# Patient Record
Sex: FEMALE | Race: WHITE | NOT HISPANIC OR LATINO | Employment: OTHER | URBAN - METROPOLITAN AREA
[De-identification: names, ages, dates, MRNs, and addresses within clinical notes are randomized per-mention and may not be internally consistent; named-entity substitution may affect disease eponyms.]

---

## 2021-09-23 NOTE — PLAN OF CARE
Patient Name: Bee Andrews    : 1950    MRN: [id number]    Surgeon: Dr Osborne    DOS/Procedure:  2021 - L-TKA Revision    Hospital: [facility]    PCP-Joleen Ritter [facility] ([place]) BCBS gold Medicare         Assessment Findings: Will update with additional concerns from the H&P as indicated  Medical              Fibromyalgia - managed voltaren              Osteopenia              MECHELLE- cpap  (1pt)              BMI = 32.54              Functional              Currently Ambulation - Cane (currently doing upper body strength & isometric exercise)              Met score - steering clear of people Covid - denied sob (very active in the past)              Falls -0         Living Situation              Stairs to enter home -  3 steps from garage - all one level              Bathroom setup - Walk -in shower - hand held shower - shower chair -elevated toilet - with counter close to hang onto              Transportation available -  will take her (daughter lives in [place] and lives close)              Support         Post Op Support/Resources              3 adults - available to provide support              Commode              Walker & cane    Discharge Disposition              Daughter s home - 3 steps to enter from garage - all one level (Higher profile toilet)- shower 1 step in              Stay at daughters for first 2 weeks - HEP - will schedule outpatient therapy      Please direct questions or concerns to:  Barbi Alejandre RN  Trauma Coordinator     [facility]  [place]  [address]   [place] [address]  T: [phone]  C. [phone]  F: [phone]  E: [email or web address]

## 2021-10-18 DIAGNOSIS — Z11.59 ENCOUNTER FOR SCREENING FOR OTHER VIRAL DISEASES: ICD-10-CM

## 2021-11-16 ENCOUNTER — LAB (OUTPATIENT)
Dept: URGENT CARE | Facility: URGENT CARE | Age: 71
End: 2021-11-16
Attending: ORTHOPAEDIC SURGERY
Payer: MEDICARE

## 2021-11-16 DIAGNOSIS — Z11.59 ENCOUNTER FOR SCREENING FOR OTHER VIRAL DISEASES: ICD-10-CM

## 2021-11-16 LAB — SARS-COV-2 RNA RESP QL NAA+PROBE: NEGATIVE

## 2021-11-16 PROCEDURE — U0003 INFECTIOUS AGENT DETECTION BY NUCLEIC ACID (DNA OR RNA); SEVERE ACUTE RESPIRATORY SYNDROME CORONAVIRUS 2 (SARS-COV-2) (CORONAVIRUS DISEASE [COVID-19]), AMPLIFIED PROBE TECHNIQUE, MAKING USE OF HIGH THROUGHPUT TECHNOLOGIES AS DESCRIBED BY CMS-2020-01-R: HCPCS

## 2021-11-16 PROCEDURE — U0005 INFEC AGEN DETEC AMPLI PROBE: HCPCS

## 2021-11-17 RX ORDER — MULTIVIT WITH MINERALS/LUTEIN
1 TABLET ORAL DAILY
COMMUNITY

## 2021-11-17 RX ORDER — VITAMIN E 268 MG
1 CAPSULE ORAL DAILY
COMMUNITY

## 2021-11-17 RX ORDER — EZETIMIBE 10 MG/1
10 TABLET ORAL AT BEDTIME
COMMUNITY

## 2021-11-17 RX ORDER — AMOXICILLIN 500 MG/1
2000 CAPSULE ORAL
COMMUNITY

## 2021-11-17 RX ORDER — KETOCONAZOLE 20 MG/ML
SHAMPOO TOPICAL
COMMUNITY
End: 2022-04-19

## 2021-11-17 RX ORDER — ACETAMINOPHEN 500 MG
1500 TABLET ORAL EVERY 6 HOURS PRN
Status: ON HOLD | COMMUNITY
End: 2021-11-20

## 2021-11-17 RX ORDER — TURMERIC ROOT EXTRACT 500 MG
1 TABLET ORAL DAILY
COMMUNITY

## 2021-11-17 RX ORDER — CHLORAL HYDRATE 500 MG
1 CAPSULE ORAL DAILY
COMMUNITY
End: 2022-04-19

## 2021-11-17 RX ORDER — LORATADINE 10 MG/1
1-2 TABLET ORAL DAILY PRN
COMMUNITY

## 2021-11-18 ENCOUNTER — ANESTHESIA EVENT (OUTPATIENT)
Dept: SURGERY | Facility: CLINIC | Age: 71
End: 2021-11-18
Payer: MEDICARE

## 2021-11-18 NOTE — ANESTHESIA PREPROCEDURE EVALUATION
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]  Osteoarthritis of right knee, unspecified osteoarthritis type [M17.11]    Procedure : Procedure(s):  REVISION LEFT TOTAL KNEE ARTHROPLASTY          Past Medical History:   Diagnosis Date     Fibromyalgia      Gastroesophageal reflux disease      Hyperlipidemia      Osteopenia      Sleep apnea      Tinnitus of both ears       Past Surgical History:   Procedure Laterality Date     COLONOSCOPY       GYN SURGERY      tubal ligation     ORTHOPEDIC SURGERY Left     L TKA     ORTHOPEDIC SURGERY Right     R TKA      No Known Allergies   Social History     Tobacco Use     Smoking status: Never Smoker     Smokeless tobacco: Never Used   Substance Use Topics     Alcohol use: Never      Wt Readings from Last 1 Encounters:   No data found for Wt        Anesthesia Evaluation   Pt has had prior anesthetic.     No history of anesthetic complications       ROS/MED HX  ENT/Pulmonary:     (+) sleep apnea, uses CPAP,  (-) tobacco use and asthma   Neurologic: Comment: Fibromyalgia, tinnitus       Cardiovascular: Comment: SR, first degree AVB     (+) Dyslipidemia ----- (-) SIEGEL, orthopnea/PND and syncope   METS/Exercise Tolerance: >4 METS    Hematologic:    (-) history of blood clots   Musculoskeletal:   (+) arthritis,     GI/Hepatic: Comment: Schatzki ring, ulcers     (+) GERD, Asymptomatic on medication,     Renal/Genitourinary:    (-) renal disease   Endo:     (+) Obesity,  (-) Type II DM   Psychiatric/Substance Use:       Infectious Disease:    (-) Recent Fever   Malignancy:       Other:      (+) , H/O Chronic Pain,        Physical Exam    Airway        Mallampati: II   TM distance: > 3 FB   Neck ROM: full   Mouth opening: > 3 cm    Respiratory Devices and Support         Dental       (+) caps      Cardiovascular          Rhythm  and rate: regular     Pulmonary           breath sounds clear to auscultation           OUTSIDE LABS:  CBC: No results found for: WBC, HGB, HCT, PLT  BMP: No results found for: NA, POTASSIUM, CHLORIDE, CO2, BUN, CR, GLC  COAGS: No results found for: PTT, INR, FIBR  POC: No results found for: BGM, HCG, HCGS  HEPATIC: No results found for: ALBUMIN, PROTTOTAL, ALT, AST, GGT, ALKPHOS, BILITOTAL, BILIDIRECT, CHARLETTE  OTHER: No results found for: PH, LACT, A1C, SIMONA, PHOS, MAG, LIPASE, AMYLASE, TSH, T4, T3, CRP, SED  Prior to Admission medications    Medication Sig Start Date End Date Taking? Authorizing Provider   acetaminophen (TYLENOL) 500 MG tablet Take 1,500 mg by mouth every 6 hours as needed for mild pain (3 X 500 mg)   Yes Reported, Patient   amoxicillin (AMOXIL) 500 MG capsule Take 2,000 mg by mouth once as needed (ONE HOUR PRIOR TO DENTAL APPOINTMENT)   Yes Reported, Patient   Calcium-Magnesium-Vitamin D (CALCIUM 1200+D3 PO) Take 1 capsule by mouth daily   Yes Reported, Patient   Carboxymethylcellulose Sodium (REFRESH TEARS OP) Place 1 drop into both eyes 2 times daily as needed   Yes Reported, Patient   ezetimibe (ZETIA) 10 MG tablet Take 10 mg by mouth daily   Yes Reported, Patient   fish oil-omega-3 fatty acids 1000 MG capsule Take 1 g by mouth daily   Yes Reported, Patient   Flaxseed, Linseed, (FLAXSEED OIL) 1000 MG CAPS Take 1 capsule by mouth daily   Yes Reported, Patient   ketoconazole (NIZORAL) 2 % external shampoo Apply topically twice a week   Yes Reported, Patient   loratadine (CLARITIN) 10 MG tablet Take 1-2 tablets by mouth daily as needed for allergies   Yes Reported, Patient   Multiple Vitamins-Minerals (PRESERVISION AREDS 2 PO) Take 2 capsules by mouth daily   Yes Reported, Patient   multivitamin (CENTRUM SILVER) tablet Take 1 tablet by mouth daily   Yes Reported, Patient   Phenylephrine-APAP-guaiFENesin (TYLENOL SINUS SEVERE PO) Take 1 tablet by mouth daily as needed   Yes Reported, Patient    Turmeric 500 MG TABS Take 1 tablet by mouth daily   Yes Reported, Patient     Current Facility-Administered Medications Ordered in Epic   Medication Dose Route Frequency Last Rate Last Admin     ceFAZolin (ANCEF) intermittent infusion 2 g in 100 mL dextrose PRE-MIX  2 g Intravenous Pre-Op/Pre-procedure x 1 dose         ceFAZolin (ANCEF) intermittent infusion 2 g in 100 mL dextrose PRE-MIX  2 g Intravenous See Admin Instructions         lactated ringers infusion   Intravenous Continuous 25 mL/hr at 11/19/21 0833 New Bag at 11/19/21 0833     lidocaine 1 % 0.1-1 mL  0.1-1 mL Other Q1H PRN         ropivacaine (NAROPIN) 300 mg, ketorolac (TORADOL) 30 mg, EPINEPHrine (ADRENALIN) 0.6 mg in sodium chloride 0.9 % 100 mL (ORTHO JOSSELIN STANDARD DOSE)   INTRA-ARTICULAR On Call to OR         No current [facility]-ordered outpatient medications on file.       lactated ringers 25 mL/hr at 11/19/21 0833     No results for input(s): ABO, RH in the last 89403 hours.  No results for input(s): HCG in the last 84981 hours.  No results found for this or any previous visit (from the past 744 hour(s)).    Anesthesia Plan    ASA Status:  2      Anesthesia Type: Spinal.              Consents    Anesthesia Plan(s) and associated risks, benefits, and realistic alternatives discussed. Questions answered and patient/representative(s) expressed understanding.    - Discussed:     - Discussed with:  Patient         Postoperative Care    Pain management: Peripheral nerve block (Single Shot).   PONV prophylaxis: Ondansetron (or other 5HT-3)     Comments:    Other Comments: No NSAIDs            Ashley Pablo MD

## 2021-11-18 NOTE — PROGRESS NOTES
PTA medications updated by Medication Scribe prior to surgery via phone call with patient (last doses completed by Nurse)     Medication history sources: Patient, H&P and Patient's home med list  In the past week, patient estimated taking medication this percent of the time: Greater than 90%  Adherence assessment: N/A Not Observed    Significant changes made to the medication list:  Patient reports no longer taking the following meds (med scribe removed from PTA med list): Omeprazole, Diclofenac Gel      Additional medication history information:   Patient brought own home meds: Refresh Tears     Medication reconciliation completed by provider prior to medication history? No    Time spent in this activity: 25 minutes    The information provided in this note is only as accurate as the sources available at the time of update(s)      Prior to Admission medications    Medication Sig Last Dose Taking? Auth Provider   acetaminophen (TYLENOL) 500 MG tablet Take 1,500 mg by mouth every 6 hours as needed for mild pain (3 X 500 mg)  at PRN Yes Reported, Patient   amoxicillin (AMOXIL) 500 MG capsule Take 2,000 mg by mouth once as needed (ONE HOUR PRIOR TO DENTAL APPOINTMENT)  at PRN Yes Reported, Patient   Calcium-Magnesium-Vitamin D (CALCIUM 1200+D3 PO) Take 1 capsule by mouth daily 11/18/2021 at AM Yes Reported, Patient   Carboxymethylcellulose Sodium (REFRESH TEARS OP) Place 1 drop into both eyes 2 times daily as needed 11/18/2021 at PM Yes Reported, Patient   ezetimibe (ZETIA) 10 MG tablet Take 10 mg by mouth daily 11/18/2021 at PM Yes Reported, Patient   fish oil-omega-3 fatty acids 1000 MG capsule Take 1 g by mouth daily 11/18/2021 at AM Yes Reported, Patient   Flaxseed, Linseed, (FLAXSEED OIL) 1000 MG CAPS Take 1 capsule by mouth daily 11/18/2021 at AM Yes Reported, Patient   ketoconazole (NIZORAL) 2 % external shampoo Apply topically twice a week Past Week at Unknown time Yes Reported, Patient   loratadine (CLARITIN)  10 MG tablet Take 1-2 tablets by mouth daily as needed for allergies  at PRN Yes Reported, Patient   Multiple Vitamins-Minerals (PRESERVISION AREDS 2 PO) Take 2 capsules by mouth daily 11/18/2021 at AM Yes Reported, Patient   multivitamin (CENTRUM SILVER) tablet Take 1 tablet by mouth daily 11/18/2021 at AM Yes Reported, Patient   Phenylephrine-APAP-guaiFENesin (TYLENOL SINUS SEVERE PO) Take 1 tablet by mouth daily as needed  at PRN Yes Reported, Patient   Turmeric 500 MG TABS Take 1 tablet by mouth daily 11/18/2021 at AM Yes Reported, Patient       Medication history completed by:    Clark Hernandez CPhT  Medication [facility]

## 2021-11-19 ENCOUNTER — ANESTHESIA (OUTPATIENT)
Dept: SURGERY | Facility: CLINIC | Age: 71
End: 2021-11-19
Payer: MEDICARE

## 2021-11-19 ENCOUNTER — APPOINTMENT (OUTPATIENT)
Dept: PHYSICAL THERAPY | Facility: CLINIC | Age: 71
End: 2021-11-19
Attending: ORTHOPAEDIC SURGERY
Payer: MEDICARE

## 2021-11-19 ENCOUNTER — HOSPITAL ENCOUNTER (OUTPATIENT)
Facility: CLINIC | Age: 71
Discharge: HOME | End: 2021-11-21
Attending: ORTHOPAEDIC SURGERY | Admitting: ORTHOPAEDIC SURGERY
Payer: MEDICARE

## 2021-11-19 DIAGNOSIS — Z96.652 STATUS POST REVISION OF TOTAL REPLACEMENT OF LEFT KNEE: Primary | ICD-10-CM

## 2021-11-19 PROBLEM — Z96.659 S/P REVISION OF TOTAL KNEE: Status: ACTIVE | Noted: 2021-11-19

## 2021-11-19 PROCEDURE — 370N000017 HC ANESTHESIA TECHNICAL FEE, PER MIN: Performed by: ORTHOPAEDIC SURGERY

## 2021-11-19 PROCEDURE — 250N000011 HC RX IP 250 OP 636: Performed by: ANESTHESIOLOGY

## 2021-11-19 PROCEDURE — 258N000003 HC RX IP 258 OP 636: Performed by: NURSE ANESTHETIST, CERTIFIED REGISTERED

## 2021-11-19 PROCEDURE — 999N000141 HC STATISTIC PRE-PROCEDURE NURSING ASSESSMENT: Performed by: ORTHOPAEDIC SURGERY

## 2021-11-19 PROCEDURE — 250N000011 HC RX IP 250 OP 636: Performed by: NURSE ANESTHETIST, CERTIFIED REGISTERED

## 2021-11-19 PROCEDURE — 272N000001 HC OR GENERAL SUPPLY STERILE: Performed by: ORTHOPAEDIC SURGERY

## 2021-11-19 PROCEDURE — 97116 GAIT TRAINING THERAPY: CPT | Mod: GP

## 2021-11-19 PROCEDURE — C1776 JOINT DEVICE (IMPLANTABLE): HCPCS | Performed by: ORTHOPAEDIC SURGERY

## 2021-11-19 PROCEDURE — 97161 PT EVAL LOW COMPLEX 20 MIN: CPT | Mod: GP

## 2021-11-19 PROCEDURE — 250N000011 HC RX IP 250 OP 636: Performed by: PHYSICIAN ASSISTANT

## 2021-11-19 PROCEDURE — 250N000009 HC RX 250: Performed by: ANESTHESIOLOGY

## 2021-11-19 PROCEDURE — 250N000009 HC RX 250: Performed by: NURSE ANESTHETIST, CERTIFIED REGISTERED

## 2021-11-19 PROCEDURE — 258N000003 HC RX IP 258 OP 636: Performed by: ANESTHESIOLOGY

## 2021-11-19 PROCEDURE — 250N000013 HC RX MED GY IP 250 OP 250 PS 637: Performed by: PHYSICIAN ASSISTANT

## 2021-11-19 PROCEDURE — 258N000003 HC RX IP 258 OP 636: Performed by: PHYSICIAN ASSISTANT

## 2021-11-19 PROCEDURE — 278N000051 HC OR IMPLANT GENERAL: Performed by: ORTHOPAEDIC SURGERY

## 2021-11-19 PROCEDURE — 360N000078 HC SURGERY LEVEL 5, PER MIN: Performed by: ORTHOPAEDIC SURGERY

## 2021-11-19 PROCEDURE — 710N000009 HC RECOVERY PHASE 1, LEVEL 1, PER MIN: Performed by: ORTHOPAEDIC SURGERY

## 2021-11-19 PROCEDURE — 250N000009 HC RX 250: Performed by: ORTHOPAEDIC SURGERY

## 2021-11-19 PROCEDURE — 97530 THERAPEUTIC ACTIVITIES: CPT | Mod: GP

## 2021-11-19 PROCEDURE — 258N000001 HC RX 258: Performed by: ORTHOPAEDIC SURGERY

## 2021-11-19 DEVICE — ATTUNE KNEE SYSTEM REVISION ROTATING PLATFORM TIBIAL BASE CEMENTED SIZE 4
Type: IMPLANTABLE DEVICE | Site: KNEE | Status: FUNCTIONAL
Brand: ATTUNE

## 2021-11-19 DEVICE — ATTUNE KNEE SYSTEM REVISION PRESSFIT STEM 12X60MM
Type: IMPLANTABLE DEVICE | Site: KNEE | Status: FUNCTIONAL
Brand: ATTUNE

## 2021-11-19 DEVICE — TOBRA FULL DOSE ANTIBIOTIC BONE CEMENT, 10 PACK CATALOG NUMBER IS 6197-9-010
Type: IMPLANTABLE DEVICE | Site: KNEE | Status: FUNCTIONAL
Brand: SIMPLEX

## 2021-11-19 DEVICE — FULL DOSE BONE CEMENT, 10 PACK CATALOG NUMBER IS 6191-1-010
Type: IMPLANTABLE DEVICE | Site: KNEE | Status: FUNCTIONAL
Brand: SIMPLEX

## 2021-11-19 DEVICE — ATTUNE KNEE SYSTEM REVISION POSTERIOR FEMORAL AUGMENT 8MM CEMENTED SIZE 4
Type: IMPLANTABLE DEVICE | Site: KNEE | Status: FUNCTIONAL
Brand: ATTUNE

## 2021-11-19 DEVICE — ATTUNE KNEE SYSTEM REVISION TIBIAL SLEEVE POROCOAT PARTIALLY COATED 37MM
Type: IMPLANTABLE DEVICE | Site: KNEE | Status: FUNCTIONAL
Brand: ATTUNE

## 2021-11-19 RX ORDER — OXYCODONE HYDROCHLORIDE 5 MG/1
10 TABLET ORAL EVERY 4 HOURS PRN
Status: DISCONTINUED | OUTPATIENT
Start: 2021-11-19 | End: 2021-11-21 | Stop reason: HOSPADM

## 2021-11-19 RX ORDER — HYDROMORPHONE HCL IN WATER/PF 6 MG/30 ML
0.2 PATIENT CONTROLLED ANALGESIA SYRINGE INTRAVENOUS EVERY 5 MIN PRN
Status: DISCONTINUED | OUTPATIENT
Start: 2021-11-19 | End: 2021-11-19 | Stop reason: HOSPADM

## 2021-11-19 RX ORDER — AMOXICILLIN 250 MG
1 CAPSULE ORAL 2 TIMES DAILY
Status: DISCONTINUED | OUTPATIENT
Start: 2021-11-19 | End: 2021-11-21 | Stop reason: HOSPADM

## 2021-11-19 RX ORDER — LIDOCAINE 40 MG/G
CREAM TOPICAL
Status: DISCONTINUED | OUTPATIENT
Start: 2021-11-19 | End: 2021-11-21 | Stop reason: HOSPADM

## 2021-11-19 RX ORDER — ONDANSETRON 2 MG/ML
4 INJECTION INTRAMUSCULAR; INTRAVENOUS EVERY 30 MIN PRN
Status: DISCONTINUED | OUTPATIENT
Start: 2021-11-19 | End: 2021-11-19 | Stop reason: HOSPADM

## 2021-11-19 RX ORDER — ONDANSETRON 2 MG/ML
INJECTION INTRAMUSCULAR; INTRAVENOUS PRN
Status: DISCONTINUED | OUTPATIENT
Start: 2021-11-19 | End: 2021-11-19

## 2021-11-19 RX ORDER — EPHEDRINE SULFATE 50 MG/ML
INJECTION, SOLUTION INTRAMUSCULAR; INTRAVENOUS; SUBCUTANEOUS PRN
Status: DISCONTINUED | OUTPATIENT
Start: 2021-11-19 | End: 2021-11-19

## 2021-11-19 RX ORDER — ACETAMINOPHEN 325 MG/1
975 TABLET ORAL EVERY 8 HOURS
Status: DISCONTINUED | OUTPATIENT
Start: 2021-11-19 | End: 2021-11-21 | Stop reason: HOSPADM

## 2021-11-19 RX ORDER — BISACODYL 10 MG
10 SUPPOSITORY, RECTAL RECTAL DAILY PRN
Status: DISCONTINUED | OUTPATIENT
Start: 2021-11-19 | End: 2021-11-21 | Stop reason: HOSPADM

## 2021-11-19 RX ORDER — HYDROMORPHONE HCL IN WATER/PF 6 MG/30 ML
0.4 PATIENT CONTROLLED ANALGESIA SYRINGE INTRAVENOUS
Status: DISCONTINUED | OUTPATIENT
Start: 2021-11-19 | End: 2021-11-21 | Stop reason: HOSPADM

## 2021-11-19 RX ORDER — BUPIVACAINE HYDROCHLORIDE 7.5 MG/ML
INJECTION, SOLUTION INTRASPINAL
Status: COMPLETED | OUTPATIENT
Start: 2021-11-19 | End: 2021-11-19

## 2021-11-19 RX ORDER — NALOXONE HYDROCHLORIDE 0.4 MG/ML
0.2 INJECTION, SOLUTION INTRAMUSCULAR; INTRAVENOUS; SUBCUTANEOUS
Status: DISCONTINUED | OUTPATIENT
Start: 2021-11-19 | End: 2021-11-21 | Stop reason: HOSPADM

## 2021-11-19 RX ORDER — SODIUM CHLORIDE, SODIUM LACTATE, POTASSIUM CHLORIDE, CALCIUM CHLORIDE 600; 310; 30; 20 MG/100ML; MG/100ML; MG/100ML; MG/100ML
INJECTION, SOLUTION INTRAVENOUS CONTINUOUS
Status: DISCONTINUED | OUTPATIENT
Start: 2021-11-19 | End: 2021-11-19 | Stop reason: HOSPADM

## 2021-11-19 RX ORDER — CEFAZOLIN SODIUM 2 G/100ML
2 INJECTION, SOLUTION INTRAVENOUS
Status: COMPLETED | OUTPATIENT
Start: 2021-11-19 | End: 2021-11-19

## 2021-11-19 RX ORDER — ONDANSETRON 4 MG/1
4 TABLET, ORALLY DISINTEGRATING ORAL EVERY 30 MIN PRN
Status: DISCONTINUED | OUTPATIENT
Start: 2021-11-19 | End: 2021-11-19 | Stop reason: HOSPADM

## 2021-11-19 RX ORDER — ACETAMINOPHEN 325 MG/1
650 TABLET ORAL EVERY 4 HOURS PRN
Status: DISCONTINUED | OUTPATIENT
Start: 2021-11-22 | End: 2021-11-21 | Stop reason: HOSPADM

## 2021-11-19 RX ORDER — PROPOFOL 10 MG/ML
INJECTION, EMULSION INTRAVENOUS PRN
Status: DISCONTINUED | OUTPATIENT
Start: 2021-11-19 | End: 2021-11-19

## 2021-11-19 RX ORDER — POLYETHYLENE GLYCOL 3350 17 G/17G
17 POWDER, FOR SOLUTION ORAL DAILY
Status: DISCONTINUED | OUTPATIENT
Start: 2021-11-20 | End: 2021-11-21 | Stop reason: HOSPADM

## 2021-11-19 RX ORDER — FENTANYL CITRATE 0.05 MG/ML
25 INJECTION, SOLUTION INTRAMUSCULAR; INTRAVENOUS EVERY 5 MIN PRN
Status: DISCONTINUED | OUTPATIENT
Start: 2021-11-19 | End: 2021-11-19 | Stop reason: HOSPADM

## 2021-11-19 RX ORDER — PROPOFOL 10 MG/ML
INJECTION, EMULSION INTRAVENOUS CONTINUOUS PRN
Status: DISCONTINUED | OUTPATIENT
Start: 2021-11-19 | End: 2021-11-19

## 2021-11-19 RX ORDER — CEFAZOLIN SODIUM 2 G/100ML
2 INJECTION, SOLUTION INTRAVENOUS SEE ADMIN INSTRUCTIONS
Status: DISCONTINUED | OUTPATIENT
Start: 2021-11-19 | End: 2021-11-19 | Stop reason: HOSPADM

## 2021-11-19 RX ORDER — SODIUM CHLORIDE, SODIUM LACTATE, POTASSIUM CHLORIDE, CALCIUM CHLORIDE 600; 310; 30; 20 MG/100ML; MG/100ML; MG/100ML; MG/100ML
INJECTION, SOLUTION INTRAVENOUS CONTINUOUS
Status: DISCONTINUED | OUTPATIENT
Start: 2021-11-19 | End: 2021-11-21 | Stop reason: HOSPADM

## 2021-11-19 RX ORDER — LIDOCAINE HYDROCHLORIDE 20 MG/ML
INJECTION, SOLUTION INFILTRATION; PERINEURAL PRN
Status: DISCONTINUED | OUTPATIENT
Start: 2021-11-19 | End: 2021-11-19

## 2021-11-19 RX ORDER — LORATADINE 10 MG/1
10-20 TABLET ORAL DAILY PRN
Status: DISCONTINUED | OUTPATIENT
Start: 2021-11-19 | End: 2021-11-21 | Stop reason: HOSPADM

## 2021-11-19 RX ORDER — NALOXONE HYDROCHLORIDE 0.4 MG/ML
0.4 INJECTION, SOLUTION INTRAMUSCULAR; INTRAVENOUS; SUBCUTANEOUS
Status: DISCONTINUED | OUTPATIENT
Start: 2021-11-19 | End: 2021-11-21 | Stop reason: HOSPADM

## 2021-11-19 RX ORDER — OXYCODONE HYDROCHLORIDE 5 MG/1
5 TABLET ORAL EVERY 4 HOURS PRN
Status: DISCONTINUED | OUTPATIENT
Start: 2021-11-19 | End: 2021-11-19

## 2021-11-19 RX ORDER — FENTANYL CITRATE 50 UG/ML
INJECTION, SOLUTION INTRAMUSCULAR; INTRAVENOUS PRN
Status: DISCONTINUED | OUTPATIENT
Start: 2021-11-19 | End: 2021-11-19

## 2021-11-19 RX ORDER — HYDROMORPHONE HCL IN WATER/PF 6 MG/30 ML
0.2 PATIENT CONTROLLED ANALGESIA SYRINGE INTRAVENOUS
Status: DISCONTINUED | OUTPATIENT
Start: 2021-11-19 | End: 2021-11-21 | Stop reason: HOSPADM

## 2021-11-19 RX ORDER — PROCHLORPERAZINE MALEATE 5 MG
5 TABLET ORAL EVERY 6 HOURS PRN
Status: DISCONTINUED | OUTPATIENT
Start: 2021-11-19 | End: 2021-11-21 | Stop reason: HOSPADM

## 2021-11-19 RX ORDER — TRANEXAMIC ACID 650 MG/1
1950 TABLET ORAL ONCE
Status: COMPLETED | OUTPATIENT
Start: 2021-11-19 | End: 2021-11-19

## 2021-11-19 RX ORDER — LIDOCAINE 40 MG/G
CREAM TOPICAL
Status: DISCONTINUED | OUTPATIENT
Start: 2021-11-19 | End: 2021-11-19 | Stop reason: HOSPADM

## 2021-11-19 RX ORDER — CEFAZOLIN SODIUM 2 G/100ML
2 INJECTION, SOLUTION INTRAVENOUS EVERY 8 HOURS
Status: COMPLETED | OUTPATIENT
Start: 2021-11-19 | End: 2021-11-20

## 2021-11-19 RX ORDER — ONDANSETRON 2 MG/ML
4 INJECTION INTRAMUSCULAR; INTRAVENOUS EVERY 6 HOURS PRN
Status: DISCONTINUED | OUTPATIENT
Start: 2021-11-19 | End: 2021-11-21 | Stop reason: HOSPADM

## 2021-11-19 RX ORDER — OXYCODONE HYDROCHLORIDE 5 MG/1
5 TABLET ORAL EVERY 4 HOURS PRN
Status: DISCONTINUED | OUTPATIENT
Start: 2021-11-19 | End: 2021-11-21 | Stop reason: HOSPADM

## 2021-11-19 RX ORDER — ONDANSETRON 4 MG/1
4 TABLET, ORALLY DISINTEGRATING ORAL EVERY 6 HOURS PRN
Status: DISCONTINUED | OUTPATIENT
Start: 2021-11-19 | End: 2021-11-21 | Stop reason: HOSPADM

## 2021-11-19 RX ORDER — EZETIMIBE 10 MG/1
10 TABLET ORAL AT BEDTIME
Status: DISCONTINUED | OUTPATIENT
Start: 2021-11-19 | End: 2021-11-21 | Stop reason: HOSPADM

## 2021-11-19 RX ADMIN — Medication 5 MG: at 10:33

## 2021-11-19 RX ADMIN — PROPOFOL 30 MG: 10 INJECTION, EMULSION INTRAVENOUS at 12:08

## 2021-11-19 RX ADMIN — MIDAZOLAM 1.5 MG: 1 INJECTION INTRAMUSCULAR; INTRAVENOUS at 10:12

## 2021-11-19 RX ADMIN — CEFAZOLIN SODIUM 2 G: 2 INJECTION, SOLUTION INTRAVENOUS at 10:26

## 2021-11-19 RX ADMIN — CEFAZOLIN SODIUM 2 G: 2 INJECTION, SOLUTION INTRAVENOUS at 18:19

## 2021-11-19 RX ADMIN — FENTANYL CITRATE 25 MCG: 50 INJECTION, SOLUTION INTRAMUSCULAR; INTRAVENOUS at 14:25

## 2021-11-19 RX ADMIN — Medication 12 MCG: at 12:36

## 2021-11-19 RX ADMIN — MIDAZOLAM 1.5 MG: 1 INJECTION INTRAMUSCULAR; INTRAVENOUS at 09:01

## 2021-11-19 RX ADMIN — ACETAMINOPHEN 975 MG: 325 TABLET, FILM COATED ORAL at 16:36

## 2021-11-19 RX ADMIN — SODIUM CHLORIDE, POTASSIUM CHLORIDE, SODIUM LACTATE AND CALCIUM CHLORIDE: 600; 310; 30; 20 INJECTION, SOLUTION INTRAVENOUS at 16:38

## 2021-11-19 RX ADMIN — FENTANYL CITRATE 25 MCG: 50 INJECTION, SOLUTION INTRAMUSCULAR; INTRAVENOUS at 10:18

## 2021-11-19 RX ADMIN — SODIUM CHLORIDE, POTASSIUM CHLORIDE, SODIUM LACTATE AND CALCIUM CHLORIDE: 600; 310; 30; 20 INJECTION, SOLUTION INTRAVENOUS at 08:33

## 2021-11-19 RX ADMIN — EZETIMIBE 10 MG: 10 TABLET ORAL at 22:38

## 2021-11-19 RX ADMIN — PHENYLEPHRINE HYDROCHLORIDE 100 MCG: 10 INJECTION INTRAVENOUS at 10:59

## 2021-11-19 RX ADMIN — PHENYLEPHRINE HYDROCHLORIDE 0.25 MCG/KG/MIN: 10 INJECTION INTRAVENOUS at 10:42

## 2021-11-19 RX ADMIN — PROPOFOL 30 MG: 10 INJECTION, EMULSION INTRAVENOUS at 12:07

## 2021-11-19 RX ADMIN — ASPIRIN 325 MG: 325 TABLET, COATED ORAL at 16:36

## 2021-11-19 RX ADMIN — FENTANYL CITRATE 25 MCG: 50 INJECTION, SOLUTION INTRAMUSCULAR; INTRAVENOUS at 10:20

## 2021-11-19 RX ADMIN — OXYCODONE HYDROCHLORIDE 5 MG: 5 TABLET ORAL at 22:38

## 2021-11-19 RX ADMIN — LIDOCAINE HYDROCHLORIDE 60 MG: 20 INJECTION, SOLUTION INFILTRATION; PERINEURAL at 10:27

## 2021-11-19 RX ADMIN — HYDROMORPHONE HYDROCHLORIDE 0.2 MG: 0.2 INJECTION, SOLUTION INTRAMUSCULAR; INTRAVENOUS; SUBCUTANEOUS at 17:04

## 2021-11-19 RX ADMIN — SENNOSIDES AND DOCUSATE SODIUM 1 TABLET: 50; 8.6 TABLET ORAL at 22:38

## 2021-11-19 RX ADMIN — PROPOFOL 30 MG: 10 INJECTION, EMULSION INTRAVENOUS at 12:32

## 2021-11-19 RX ADMIN — ONDANSETRON 4 MG: 2 INJECTION INTRAMUSCULAR; INTRAVENOUS at 12:49

## 2021-11-19 RX ADMIN — FENTANYL CITRATE 25 MCG: 50 INJECTION, SOLUTION INTRAMUSCULAR; INTRAVENOUS at 14:17

## 2021-11-19 RX ADMIN — PHENYLEPHRINE HYDROCHLORIDE 100 MCG: 10 INJECTION INTRAVENOUS at 11:04

## 2021-11-19 RX ADMIN — PHENYLEPHRINE HYDROCHLORIDE 100 MCG: 10 INJECTION INTRAVENOUS at 10:33

## 2021-11-19 RX ADMIN — MIDAZOLAM 0.5 MG: 1 INJECTION INTRAMUSCULAR; INTRAVENOUS at 10:17

## 2021-11-19 RX ADMIN — TRANEXAMIC ACID 1950 MG: 650 TABLET ORAL at 08:22

## 2021-11-19 RX ADMIN — PHENYLEPHRINE HYDROCHLORIDE 200 MCG: 10 INJECTION INTRAVENOUS at 10:36

## 2021-11-19 RX ADMIN — BUPIVACAINE HYDROCHLORIDE IN DEXTROSE 1.8 ML: 7.5 INJECTION, SOLUTION SUBARACHNOID at 10:24

## 2021-11-19 RX ADMIN — FENTANYL CITRATE 25 MCG: 50 INJECTION, SOLUTION INTRAMUSCULAR; INTRAVENOUS at 13:55

## 2021-11-19 RX ADMIN — BUPIVACAINE HYDROCHLORIDE 15 ML: 5 INJECTION, SOLUTION EPIDURAL; INTRACAUDAL; PERINEURAL at 09:08

## 2021-11-19 RX ADMIN — HYDROMORPHONE HYDROCHLORIDE 0.2 MG: 0.2 INJECTION, SOLUTION INTRAMUSCULAR; INTRAVENOUS; SUBCUTANEOUS at 14:47

## 2021-11-19 RX ADMIN — FENTANYL CITRATE 25 MCG: 50 INJECTION, SOLUTION INTRAMUSCULAR; INTRAVENOUS at 14:03

## 2021-11-19 RX ADMIN — SODIUM CHLORIDE, POTASSIUM CHLORIDE, SODIUM LACTATE AND CALCIUM CHLORIDE: 600; 310; 30; 20 INJECTION, SOLUTION INTRAVENOUS at 10:45

## 2021-11-19 RX ADMIN — PHENYLEPHRINE HYDROCHLORIDE 200 MCG: 10 INJECTION INTRAVENOUS at 11:32

## 2021-11-19 RX ADMIN — PROPOFOL 75 MCG/KG/MIN: 10 INJECTION, EMULSION INTRAVENOUS at 10:27

## 2021-11-19 RX ADMIN — HYDROMORPHONE HYDROCHLORIDE 0.2 MG: 0.2 INJECTION, SOLUTION INTRAMUSCULAR; INTRAVENOUS; SUBCUTANEOUS at 14:40

## 2021-11-19 RX ADMIN — Medication 8 MCG: at 12:07

## 2021-11-19 RX ADMIN — OXYCODONE HYDROCHLORIDE 5 MG: 5 TABLET ORAL at 18:31

## 2021-11-19 ASSESSMENT — ENCOUNTER SYMPTOMS: ORTHOPNEA: 0

## 2021-11-19 ASSESSMENT — MIFFLIN-ST. JEOR: SCORE: 1313.75

## 2021-11-19 ASSESSMENT — LIFESTYLE VARIABLES: TOBACCO_USE: 0

## 2021-11-19 NOTE — ANESTHESIA PROCEDURE NOTES
Intrathecal Procedure Note    Pre-Procedure   Staff -        Anesthesiologist:  Ashley Pablo MD       Performed By: anesthesiologist       Location: OR       Pre-Anesthestic Checklist: patient identified, risks and benefits discussed, informed consent and pre-op evaluation  Timeout:       Correct Patient: Yes        Correct Procedure: Yes        Correct Site: Yes        Correct Position: Yes   Procedure Documentation  Procedure: intrathecal       Patient Position: sitting       Patient Prep/Sterile Barriers: sterile gloves, mask, patient draped       Skin prep: Betadine       Insertion Site: L3-4. (midline approach).       Needle Gauge: 24.        Needle Length (Inches): 3.5        Spinal Needle Type: Dinora-Mamadou       Introducer used      # of attempts: 1 and  # of redirects:     Assessment/Narrative         Paresthesias: No.       CSF fluid: clear.    Medication(s) Administered   0.75% Hyperbaric Bupivacaine (Intrathecal), 1.8 mL  Medication Administration Time: 11/19/2021 10:24 AM     Comments:  Risk IBNLT permanent nerve injury, HA and infection discussed.     X 1 into SAB.  No blood or complications.

## 2021-11-19 NOTE — ANESTHESIA POSTPROCEDURE EVALUATION
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    Anesthesia Type:  Spinal    Note:     Postop Pain Control: Uneventful            Sign Out: Well controlled pain   PONV: No   Neuro/Psych: Uneventful            Sign Out: Acceptable/Baseline neuro status   Airway/Respiratory: Uneventful            Sign Out: Acceptable/Baseline resp. status   CV/Hemodynamics: Uneventful            Sign Out: Acceptable CV status   Other NRE: NONE   DID A NON-ROUTINE EVENT OCCUR? No           Last vitals:  Vitals Value Taken Time   /68 11/19/21 1420   Temp 36.6  C (97.9  F) 11/19/21 1400   Pulse 89 11/19/21 1425   Resp 10 11/19/21 1425   SpO2 97 % 11/19/21 1425   Vitals shown include unvalidated device data.    Electronically Signed By: Ashley Pablo MD  November 19, 2021  2:26 PM

## 2021-11-19 NOTE — OR NURSING
Access RN at bedside to marli Velasquez, RN  04/18/18 7749     MARYANN Pablo at bedside. Pt ok to be transfer to the floor

## 2021-11-19 NOTE — ANESTHESIA PROCEDURE NOTES
Femoral (adductor canal approach ) Procedure Note    Pre-Procedure   Staff -        Anesthesiologist:  Ashley Pablo MD       Performed By: anesthesiologist       Location: pre-op       Pre-Anesthestic Checklist: patient identified, site marked, risks and benefits discussed, informed consent, monitors and equipment checked, pre-op evaluation, at physician/surgeon's request and post-op pain management  Timeout:       Correct Patient: Yes        Correct Procedure: Yes        Correct Site: Yes        Correct Laterality: Yes        Site Marked: Yes  Procedure Documentation  Procedure: Femoral (adductor canal approach )       Laterality: left       Patient Position: supine       Patient Prep/Sterile Barriers: sterile gloves, mask       Skin prep: Chloraprep       Local skin infiltrated with 5 mL of 1% lidocaine.        Needle Type: short bevel and insulated       Needle Gauge: 22.        Needle Length (millimeters): 90        Ultrasound guided       1. Ultrasound was used to identify targeted nerve, plexus, vascular marker, or fascial plane and place a needle adjacent to it in real-time.       2. Ultrasound was used to visualize the spread of anesthetic in close proximity to the above referenced structure.       3. A permanent image is entered into the patient's record.       4. The visualized anatomic structures appeared normal.       5. There were no apparent abnormal pathologic findings.    Assessment/Narrative         The placement was negative for: blood aspirated, painful injection and site bleeding       Paresthesias: No.     Bolus given via needle..        Secured via.        Insertion/Infusion Method: Single Shot       Complications: none    Medication(s) Administered   Bupivacaine 0.5% w/ 1:400K Epi (Injection), 15 mL  Medication Administration Time: 11/19/2021 9:08 AM     Comments:  Risks IBNLT permanent nerve injury and infection discussed with Patient.    Ultrasound Interpretation, Peripheral Nerve  Block    1. Under ultrasound guidance, the needle was inserted and placed in close proximity to the target nerve(s).  2. Ultrasound was also used to visualize the spread of the anesthetic in close proximity to the nerve(s) being blocked.  Local anesthetic was administered in incremental doses, with intermittent negative aspiration.    3. The nerve(s) appeared anatomically normal.  4. There were no apparent abnormal pathological findings.  5. A permanent ultrasound image was saved in the patient's record.    Pt tolerated well, talking throughout procedure. No issues. No nerve swelling. Low pressure, no complications.      The surgeon has given a verbal order transferring care of this patient to me for the performance of a regional analgesia block for post-op pain control. It is requested of me because I am uniquely trained and qualified to perform this block and the surgeon is neither trained nor qualified to perform this procedure.    Ashley Pablo MD   11:16 AM

## 2021-11-19 NOTE — BRIEF OP NOTE
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  Complications: None.  Implants:   Implant Name Type Inv. Item Serial No.  Lot No. LRB No. Used Action   LEFT TOTAL KNEE ARTHROPLASTY COMPONENTS (TIBIAL, FEMUR, PATELLA, POLY)      Left 1 Explanted   BONE CEMENT SIMPLEX FULL DOSE 6191-1-001 - [id number] Cement, Bone BONE CEMENT SIMPLEX FULL DOSE 6191-1-001  [employer]  Left 1 Implanted   BONE CEMENT SIMPLEX W/TOBRAMYCIN 6197-9-001 - [id number] Cement, Bone BONE CEMENT SIMPLEX W/TOBRAMYCIN 6197-9-001  [employer]  Left 1 Implanted   ATUN TIB SLV M/L 37MM HALF POR - [id number] Total Joint Component/Insert ATUN TIB SLV M/L 37MM HALF POR  [employer]- [id number] Left 1 Implanted   ATUNE PRESSFIT STR [id number] - [id number] Total Joint Component/Insert ATUNE PRESSFIT STR [id number]  [employer]- [id number] Left 1 Implanted   ATUNE PRESSFIT STR [id number] - [id number] Total Joint Component/Insert ATUNE PRESSFIT STR [id number]  [employer]- [id number] Left 1 Implanted   BASEPLATE TIBIAL SZ-4 METALLIC ROTATING PLATFORM CEMENTED - [id number] Total Joint Component/Insert BASEPLATE TIBIAL SZ-4 METALLIC ROTATING PLATFORM CEMENTED  [employer]- [id number] Left 1 Implanted   ATTUNE REVISION POSTERIOR FEMORAL AUGMENT 8MM    Depuy [id number] Left 2 Implanted   ATTUNE REVISION CRS FEMORAL LEFT     Depuy [id number] Left 1 Implanted   ATTUNE REVISION FEMORAL SLEEVE POROCOAT PARTIALLY COATED 30MM    [employer] [id number] Left 1 Implanted   ATTUNE REVISION MICAH ROTATING PLATFORM INSERT 6MM    [employer] [id number] Left 1 Implanted

## 2021-11-20 ENCOUNTER — APPOINTMENT (OUTPATIENT)
Dept: OCCUPATIONAL THERAPY | Facility: CLINIC | Age: 71
End: 2021-11-20
Attending: ORTHOPAEDIC SURGERY
Payer: MEDICARE

## 2021-11-20 ENCOUNTER — APPOINTMENT (OUTPATIENT)
Dept: PHYSICAL THERAPY | Facility: CLINIC | Age: 71
End: 2021-11-20
Attending: ORTHOPAEDIC SURGERY
Payer: MEDICARE

## 2021-11-20 PROBLEM — Z96.652 STATUS POST REVISION OF TOTAL REPLACEMENT OF LEFT KNEE: Status: ACTIVE | Noted: 2021-11-20

## 2021-11-20 LAB — HGB BLD-MCNC: 11.4 G/DL (ref 11.7–15.7)

## 2021-11-20 PROCEDURE — 97116 GAIT TRAINING THERAPY: CPT | Mod: GP

## 2021-11-20 PROCEDURE — 250N000011 HC RX IP 250 OP 636: Performed by: PHYSICIAN ASSISTANT

## 2021-11-20 PROCEDURE — 250N000013 HC RX MED GY IP 250 OP 250 PS 637: Performed by: PHYSICIAN ASSISTANT

## 2021-11-20 PROCEDURE — 258N000003 HC RX IP 258 OP 636: Performed by: PHYSICIAN ASSISTANT

## 2021-11-20 PROCEDURE — 97535 SELF CARE MNGMENT TRAINING: CPT | Mod: GO

## 2021-11-20 PROCEDURE — 97165 OT EVAL LOW COMPLEX 30 MIN: CPT | Mod: GO

## 2021-11-20 PROCEDURE — 97530 THERAPEUTIC ACTIVITIES: CPT | Mod: GP

## 2021-11-20 PROCEDURE — 85018 HEMOGLOBIN: CPT | Performed by: PHYSICIAN ASSISTANT

## 2021-11-20 PROCEDURE — 36415 COLL VENOUS BLD VENIPUNCTURE: CPT | Performed by: PHYSICIAN ASSISTANT

## 2021-11-20 RX ORDER — ACETAMINOPHEN 325 MG/1
650 TABLET ORAL EVERY 4 HOURS PRN
Qty: 100 TABLET | Refills: 0 | Status: SHIPPED | OUTPATIENT
Start: 2021-11-20 | End: 2022-04-19

## 2021-11-20 RX ORDER — OXYCODONE HYDROCHLORIDE 5 MG/1
5 TABLET ORAL EVERY 4 HOURS PRN
Qty: 50 TABLET | Refills: 0 | Status: SHIPPED | OUTPATIENT
Start: 2021-11-20 | End: 2022-04-19

## 2021-11-20 RX ORDER — AMOXICILLIN 250 MG
1 CAPSULE ORAL 2 TIMES DAILY
Qty: 60 TABLET | Refills: 0 | Status: SHIPPED | OUTPATIENT
Start: 2021-11-20 | End: 2022-04-19

## 2021-11-20 RX ORDER — ONDANSETRON 4 MG/1
4 TABLET, ORALLY DISINTEGRATING ORAL EVERY 6 HOURS PRN
Qty: 30 TABLET | Refills: 0 | Status: SHIPPED | OUTPATIENT
Start: 2021-11-20 | End: 2022-04-19

## 2021-11-20 RX ORDER — ASPIRIN 325 MG
325 TABLET, DELAYED RELEASE (ENTERIC COATED) ORAL DAILY
Qty: 45 TABLET | Refills: 0 | Status: SHIPPED | OUTPATIENT
Start: 2021-11-20 | End: 2022-04-19

## 2021-11-20 RX ADMIN — SENNOSIDES AND DOCUSATE SODIUM 1 TABLET: 50; 8.6 TABLET ORAL at 21:30

## 2021-11-20 RX ADMIN — ASPIRIN 325 MG: 325 TABLET, COATED ORAL at 08:19

## 2021-11-20 RX ADMIN — POLYETHYLENE GLYCOL 3350 17 G: 17 POWDER, FOR SOLUTION ORAL at 08:19

## 2021-11-20 RX ADMIN — SENNOSIDES AND DOCUSATE SODIUM 1 TABLET: 50; 8.6 TABLET ORAL at 08:19

## 2021-11-20 RX ADMIN — ACETAMINOPHEN 975 MG: 325 TABLET, FILM COATED ORAL at 08:19

## 2021-11-20 RX ADMIN — SODIUM CHLORIDE, POTASSIUM CHLORIDE, SODIUM LACTATE AND CALCIUM CHLORIDE: 600; 310; 30; 20 INJECTION, SOLUTION INTRAVENOUS at 01:13

## 2021-11-20 RX ADMIN — CEFAZOLIN SODIUM 2 G: 2 INJECTION, SOLUTION INTRAVENOUS at 01:13

## 2021-11-20 RX ADMIN — EZETIMIBE 10 MG: 10 TABLET ORAL at 21:30

## 2021-11-20 RX ADMIN — ACETAMINOPHEN 975 MG: 325 TABLET, FILM COATED ORAL at 15:36

## 2021-11-20 RX ADMIN — OXYCODONE HYDROCHLORIDE 10 MG: 5 TABLET ORAL at 21:30

## 2021-11-20 RX ADMIN — ONDANSETRON 4 MG: 2 INJECTION INTRAMUSCULAR; INTRAVENOUS at 17:16

## 2021-11-20 RX ADMIN — ACETAMINOPHEN 975 MG: 325 TABLET, FILM COATED ORAL at 01:13

## 2021-11-20 RX ADMIN — OXYCODONE HYDROCHLORIDE 10 MG: 5 TABLET ORAL at 17:16

## 2021-11-20 NOTE — PROGRESS NOTES
Patient alert and oriented x 4. C/O tingling on left foot . Dressing CDI. Vital sings stable. Pt transfer to the floor with belongings., glassess cpap , tote and 1 plastic bag

## 2021-11-20 NOTE — OP NOTE
Procedure Date: 11/19/2021    PREOPERATIVE DIAGNOSIS:  Aseptic loosening, left total knee arthroplasty.    POSTOPERATIVE DIAGNOSIS:  Aseptic loosening, left total knee arthroplasty.    PROCEDURE PERFORMED:  Revision left total knee arthroplasty.    IMPLANTS USED:  The implants used were DePuy revision knee system with ingrowth sleeve.  We used a size 5 femur, 4 tibia.  On the tibia, we used 37 mm sleeve with a 12 x 60 stem extension.  On the femur, we used a 30 mm ingrowth sleeve with a 12 x 60 stem.    INDICATIONS FOR PROCEDURE:  The patient is a 71-year-old female who is 11 years out from left total knee arthroplasty.  This was done elsewhere.  I saw the patient in consultation, at which time, her exam and history are very consistent with aseptic loosening.  She has been worked up for infection, found to have no evidence of infection.  She had a bone scan and hand, which showed increased uptake about the femoral and tibial components.  I discussed treatment options with the patient.  I explained to her the risks, benefits, and potential complications of total knee arthroplasty with revision total knee arthroplasty.  Discussion included but not specific to infection, vascular, neurologic complications, DVT, septic and aseptic loosening, arthrofibrosis, fracture and the possible need for further revision surgery.  After this discussion, the patient will proceed with surgery.    ANESTHETIC USED:  Spinal.    ASSISTANT:  Rajendra Robles PA-C.    DESCRIPTION OF PROCEDURE:  The patient was taken to the Operating Room and placed on the table in supine position.  After adequate induction of spinal anesthetic, a bump was placed beneath the left hip.  Pneumatic tourniquet was applied to left thigh.  The patient was given 2 grams of Ancef for infection prophylaxis, given one hour prior to incision.  We then performed sterile prep and drape of the left knee and left lower extremity.  After sterile prep and drape, the limb was  exsanguinated and tourniquet was elevated to 300 mmHg.  The old surgical scar was used.  We made a midline incision, proceeded with a medial parapatellar arthrotomy in a quad-sparing approach.  We then did a complete synovectomy and inspected the femoral component.  Femoral component was grossly loose.  I was able to pull it off without any osteotome work or any use of a saw.  There was no bonding of the cement to the femoral component.  We then removed the cement mantle without difficulty.  We then exposed the tibia.  We then removed the tibia by using osteotome and mallet.  The tibial component was also loose and posteromedial aspect.  We then checked the patellar component.  It was well fixed.  We then reamed and broached to prepare the tibia.  Once this reaming and broaching was done, we assembled the trial and the fit was ideal and the alignment was neutral.  We then reamed and broached the femur in the femoral trial and put in the trial polyethylene, reduced the knee.  We had good soft tissue balancing in both flexion and extension.  We did have to do a release off the medial side to correct the varus deformity.  The patella tracked ideally.  We will obtain full extension without difficulty.  We then removed the trial components, irrigated with pulse jet lavage and injected the posterior capsule with a mixture of ropivacaine and Toradol.  We then, with the help of my surgical assistant, we went ahead and assembled the components on the back table.  While they were doing that, I began preparing the cement surfaces using pulsatile lavage antibiotic saline.  Once cement was appropriate consistency, we cemented first the tibial tray, taking care to not get any cement on the ingrowth surface.  We then put the tibial component in place.  Once it was fully seated, excess cement was removed using Dennison elevator.  We then cemented the femoral component, again taking care to not get any cement on the ingrowth surface.   Once the femoral component was fully seated, we put the knee in full extension with trial polyethylene in place and allowed the cement to harden.  At this point, we soaked the knee in a dilute solution of Betadine for three minutes and irrigated with pulse jet lavage used approximately 3 liters of antibiotic saline and pulse jet lavage.  Once cement had hardened, we took the knee through a range of motion.  Patella tracked ideally with good soft tissue balance in flexion and extension.  We then removed the trial polyethylene, inspected the joint for loose bone and cement, removed what was found irrigated with pulse jet lavage and put in the actual TC3 rotating platform tibial polyethylene, reduced the knee and took the knee through range of motion.  Patella tracked ideally with good soft tissue balance in flexion and extension.  We will obtain full extension without difficulty.  We then irrigated again using pulse jet lavage and closed the arthrotomy using 0 Ethibond sutures.  This was oversewn using 0 Stratafix.  The deep subcu was closed using 0 Vicryl, superficial subcu was closed with 2-0 Vicryl and skin was closed with a running 3-0 Monocryl, subcuticular stitch followed by Prineo dressing.  Sterile dressing applied followed by knee immobilizer.  The patient tolerated the operative procedure.  There were no intraoperative complications.  The patient went to PAR in stable condition.    PLAN:  Plan will be for the patient to get 24 hours of Ancef for infection prophylaxis, 30 days of aspirin for DVT prophylaxis and she will be kept in a knee immobilizer for the next week.  We will see the patient back in clinic in a week and start her on range of motion.    Adelaida Osborne MD        D: 2021   T: 2021   MT: [id number]    Name:     RADHA CABRERA  MRN:      [id number]        Account:        [id number]   :      1950           Procedure Date: 2021     Document: [id number]

## 2021-11-20 NOTE — PROGRESS NOTES
"Bee Andrews  2021  POD # 1 s/p Revision LTKA  Admit Date:  2021      Doing well.  Clean wound without signs of infection.  No immediate surgical complications identified.  No excessive bleeding  Pain well-controlled.  Objective: patient was severely nauseated and vomited just before I entered the room. She believes she took all her meds on empty stomach and this triggered her nausea. Pain is controlled. She hopes to discharge home tomorrow with her daughter here in [place].   Blood pressure 134/80, pulse 98, temperature 98.5  F (36.9  C), temperature source Oral, resp. rate 16, height 1.6 m (5' 3\"), weight 83.5 kg (184 lb), SpO2 95 %.    Temperatures:  Current - Temp: 98.5  F (36.9  C); Max - Temp  Av.8  F (36.6  C)  Min: 96.8  F (36  C)  Max: 98.5  F (36.9  C)  Pulse range: Pulse  Av.4  Min: 73  Max: 98  Blood pressure range: Systolic (24hrs), Av , Min:101 , Max:146   ; Diastolic (24hrs), Av, Min:64, Max:92    Exam:  CMS: intact  alert, stable, wound ok  Knee immobilizer in place  DF/PF 5/5  Communicates clearly with examiner    Labs:  No results for input(s): POTASSIUM in the last 42501 hours.  No results for input(s): HGB in the last 37977 hours.  No results for input(s): INR in the last 79337 hours.  No results for input(s): PLT in the last 65365 hours.    PLAN: continue pain control regimen. Aspirin for dvt ppx. Discharge for home tomorrow with her daughter here in [place]. Knee immobilizer on at all times for one week.     "

## 2021-11-20 NOTE — DISCHARGE SUMMARY
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]  Osteoarthritis of right knee, unspecified osteoarthritis type [M17.11]  S/P revision of total knee [Z96.659]  Status post revision of total replacement of left knee [Z96.652]  Discharge Diagnosis: failed left total knee replacement  Procedures Performed: revision left total knee replacement  Complications: None apparent   History of Present Illness: see operative report for full HPI  Past Medical History:   Past Medical History:   Diagnosis Date     Fibromyalgia      Gastroesophageal reflux disease      Hyperlipidemia      Osteopenia      Sleep apnea      Tinnitus of both ears      Patient Active Problem List   Diagnosis     S/P revision of total knee     Status post revision of total replacement of left knee     Past Surgical History:   Procedure Laterality Date     COLONOSCOPY       GYN SURGERY      tubal ligation     ORTHOPEDIC SURGERY Left 2009    L TKA     ORTHOPEDIC SURGERY Right 2010    R TKA     Social History     Socioeconomic History     Marital status:      Spouse name: Not on file     Number of children: Not on file     Years of education: Not on file     Highest education level: Not on file   Occupational History     Not on file   Tobacco Use     Smoking status: Never Smoker     Smokeless tobacco: Never Used   Substance and Sexual Activity     Alcohol use: Never     Drug use: Never     Sexual activity: Not on file   Other Topics Concern     Not on file   Social History Narrative     Not on file     Social Determinants of Health     Financial Resource Strain: Not on file   Food Insecurity: Not on file   Transportation Needs: Not on file   Physical Activity: Not on file   Stress: Not on file   Social Connections: Not on  file   Intimate Partner Violence: Not on file   Housing Stability: Not on file     Medications on admission:   Medications Prior to Admission   Medication Sig Dispense Refill Last Dose     amoxicillin (AMOXIL) 500 MG capsule Take 2,000 mg by mouth once as needed (ONE HOUR PRIOR TO DENTAL APPOINTMENT)   More than a month at PRN     Calcium-Magnesium-Vitamin D (CALCIUM 1200+D3 PO) Take 1 capsule by mouth daily   11/18/2021 at AM     Carboxymethylcellulose Sodium (REFRESH TEARS OP) Place 1 drop into both eyes 2 times daily as needed   11/18/2021 at PM     ezetimibe (ZETIA) 10 MG tablet Take 10 mg by mouth daily   Past Week at PM     fish oil-omega-3 fatty acids 1000 MG capsule Take 1 g by mouth daily   11/18/2021 at AM     Flaxseed, Linseed, (FLAXSEED OIL) 1000 MG CAPS Take 1 capsule by mouth daily   11/18/2021 at AM     ketoconazole (NIZORAL) 2 % external shampoo Apply topically twice a week   Past Week at Unknown time     loratadine (CLARITIN) 10 MG tablet Take 1-2 tablets by mouth daily as needed for allergies   Past Month at PRN     Multiple Vitamins-Minerals (PRESERVISION AREDS 2 PO) Take 2 capsules by mouth daily   11/18/2021 at AM     multivitamin (CENTRUM SILVER) tablet Take 1 tablet by mouth daily   11/18/2021 at AM     Turmeric 500 MG TABS Take 1 tablet by mouth daily   11/18/2021 at AM     [DISCONTINUED] acetaminophen (TYLENOL) 500 MG tablet Take 1,500 mg by mouth every 6 hours as needed for mild pain (3 X 500 mg)   11/18/2021 at pm     [DISCONTINUED] Phenylephrine-APAP-guaiFENesin (TYLENOL SINUS SEVERE PO) Take 1 tablet by mouth daily as needed   11/18/2021 at am     Allergies:  No Known Allergies    Hospital Course: Patient was admitted to Orthopedics and brought to the OR on where she underwent the above named procedure. Postoperatively she did very well with no apparent complications, and she had an uneventful hospital stay. Antibiotics were given for 24 hours after surgery. She was given aspirin for DVT  prophylaxis and her pain was well controlled with oral meds, then transitioned to oral pain medications during her hospital stay. She progressed well with physical therapy and discharge to home was recommended.   Consultations Obtained During Hospitalization:  2. Physical Therapy for gait training, mobilization, range of motion and strengthening exercises  3. Occupational Therapy for activities of daily living.  4. Social Work for disposition planning.  Active Problems:    S/P revision of total knee    Status post revision of total replacement of left knee    Discharge Disposition: patient improving  Discharge Diet: resume normal pre op diet  Discharge Medications:   Current Discharge Medication List      START taking these medications    Details   aspirin (ASA) 325 MG EC tablet Take 1 tablet (325 mg) by mouth daily  Qty: 45 tablet, Refills: 0    Associated Diagnoses: Status post revision of total replacement of left knee      ondansetron (ZOFRAN-ODT) 4 MG ODT tab Take 1 tablet (4 mg) by mouth every 6 hours as needed for nausea or vomiting  Qty: 30 tablet, Refills: 0    Associated Diagnoses: Status post revision of total replacement of left knee      oxyCODONE (ROXICODONE) 5 MG tablet Take 1 tablet (5 mg) by mouth every 4 hours as needed  Qty: 50 tablet, Refills: 0    Associated Diagnoses: Status post revision of total replacement of left knee      senna-docusate (SENOKOT-S/PERICOLACE) 8.6-50 MG tablet Take 1 tablet by mouth 2 times daily  Qty: 60 tablet, Refills: 0    Associated Diagnoses: Status post revision of total replacement of left knee         CONTINUE these medications which have CHANGED    Details   acetaminophen (TYLENOL) 325 MG tablet Take 2 tablets (650 mg) by mouth every 4 hours as needed for other (mild pain)  Qty: 100 tablet, Refills: 0    Associated Diagnoses: Status post revision of total replacement of left knee         CONTINUE these medications which have NOT CHANGED    Details   amoxicillin  (AMOXIL) 500 MG capsule Take 2,000 mg by mouth once as needed (ONE HOUR PRIOR TO DENTAL APPOINTMENT)      Calcium-Magnesium-Vitamin D (CALCIUM 1200+D3 PO) Take 1 capsule by mouth daily      Carboxymethylcellulose Sodium (REFRESH TEARS OP) Place 1 drop into both eyes 2 times daily as needed      ezetimibe (ZETIA) 10 MG tablet Take 10 mg by mouth daily      fish oil-omega-3 fatty acids 1000 MG capsule Take 1 g by mouth daily      Flaxseed, Linseed, (FLAXSEED OIL) 1000 MG CAPS Take 1 capsule by mouth daily      ketoconazole (NIZORAL) 2 % external shampoo Apply topically twice a week      loratadine (CLARITIN) 10 MG tablet Take 1-2 tablets by mouth daily as needed for allergies      Multiple Vitamins-Minerals (PRESERVISION AREDS 2 PO) Take 2 capsules by mouth daily      multivitamin (CENTRUM SILVER) tablet Take 1 tablet by mouth daily      Turmeric 500 MG TABS Take 1 tablet by mouth daily         STOP taking these medications       Phenylephrine-APAP-guaiFENesin (TYLENOL SINUS SEVERE PO) Comments:   Reason for Stopping:             Code Status:  X-rays needed at the follow up visit:   Saul Johnson PA-C  11/20/2021 8:40 AM   [facility] [place]  [phone]

## 2021-11-20 NOTE — PLAN OF CARE
Physical Therapy Discharge Summary    Reason for therapy discharge:    All goals and outcomes met, no further needs identified.    Progress towards therapy goal(s). See goals on Care Plan in Epic electronic health record for goal details.  Goals met   Acute PT goals met at this time.     Therapy recommendation(s):    Continued therapy is recommended.  Rationale/Recommendations:  when cleared by MD, continued skilled OP PT for post op TKA rehab. Use of FWW for ambulation. Assist as needed with mobility and on the stairs. .

## 2021-11-20 NOTE — PROGRESS NOTES
Orthopedic Surgery  Bee Andrews  2021  Admit Date:  2021  POD 1 Day Post-Op  S/P Procedure(s):  REVISION LEFT TOTAL KNEE ARTHROPLASTY    Patient is feeling good.  Hungry and hasn't had any food since coming to the floor late last night so wants to order breakfast.  Discharge instructions reviewed.  Pain controlled.  Tolerating oral intake.  No events overnight.     Alert and orient to person, place, and time.  Vital Sign Ranges  Temperature Temp  Av.8  F (36.6  C)  Min: 96.8  F (36  C)  Max: 98.5  F (36.9  C)   Blood pressure Systolic (24hrs), Av , Min:101 , Max:146        Diastolic (24hrs), Av, Min:64, Max:92      Pulse Pulse  Av.4  Min: 73  Max: 98   Respirations Resp  Avg: 15.9  Min: 10  Max: 27   Pulse oximetry SpO2  Av.4 %  Min: 94 %  Max: 99 %       Left leg ACE wrap is clean, dry, and intact.  KI in place Minimal erythema of the surrounding skin.  Bilateral calves are soft, non-tender.  Right and left lower extremity is NVI.    Labs:  No results found for: HGB]      A/P  1. S/p revision left TKA   Continue aspirin 325 qd for DVT prophylaxis.     Mobilize with PT/OT WBAT no range of motion of left knee, continue KI for 1 weeks with leg straight.     Continue current pain regimen.   Will follow up on hgb    2. Disposition   Anticipate d/c to home today    Kjerstin L Foss, PA-C

## 2021-11-20 NOTE — PROGRESS NOTES
11/20/21 0800   Quick Adds   Type of Visit Initial Occupational Therapy Evaluation   Living Environment   People in home child(ewelina), adult;significant other   Current Living Arrangements house   Home Accessibility stairs to enter home   Number of Stairs, Main Entrance 2   Stair Railings, Main Entrance none   Transportation Anticipated family or friend will provide   Living Environment Comments planning to live with daughter in [place] before returning home to [place]. States that she plans on taking sponge baths until the immobilizer comes off, then getting assistance from  when returning home. Walk in shower present in home in [place]   Self-Care   Usual Activity Tolerance good   Current Activity Tolerance moderate   Regular Exercise Yes   Equipment Currently Used at Home raised toilet seat;cane, straight   Activity/Exercise/Self-Care Comment Regular exercise, states that she uses equipment at home. Plans to get assistance in shower and heavy IADLs   Disability/Function   Hearing Difficulty or Deaf no   Wear Glasses or Blind yes   Vision Management glasses   Concentrating, Remembering or Making Decisions Difficulty no   Difficulty Communicating no   Difficulty Eating/Swallowing no   Walking or Climbing Stairs Difficulty no   Dressing/Bathing Difficulty yes   Dressing/Bathing bathing difficulty, assistance 1 person   Dressing/Bathing Management  recently assists with LBD at baseline   Toileting issues no   Doing Errands Independently Difficulty (such as shopping) no   Fall history within last six months no   General Information   Onset of Illness/Injury or Date of Surgery 11/19/21   Referring Physician Saul Johnson   Patient/Family Therapy Goal Statement (OT) to go home medically stable   Additional Occupational Profile Info/Pertinent History of Current Problem  Aseptic loosening, left total knee arthroplasty. WBAT LLE, Immbolizer on   Existing Precautions/Restrictions fall;other (see comments)  (no  bending L knee)   Left Lower Extremity (Weight-bearing Status) weight-bearing as tolerated (WBAT)   Cognitive Status Examination   Orientation Status orientation to person, place and time   Affect/Mental Status (Cognitive) WFL   Follows Commands WFL   Visual Perception   Visual Impairment/Limitations corrective lenses full-time   Pain Assessment   Patient Currently in Pain Yes, see Vital Sign flowsheet  (5/10 L knee and IV location)   Posture   Posture forward head position   Range of Motion Comprehensive   General Range of Motion bilateral upper extremity ROM WFL   Strength Comprehensive (MMT)   General Manual Muscle Testing (MMT) Assessment no strength deficits identified   Coordination   Upper Extremity Coordination No deficits were identified   Bed Mobility   Bed Mobility rolling left;rolling right;supine-sit;sit-supine   Rolling Left [place] (Bed Mobility) supervision   Rolling Right [place] (Bed Mobility) supervision   Supine-Sit [place] (Bed Mobility) minimum assist (75% patient effort)   Sit-Supine [place] (Bed Mobility) minimum assist (75% patient effort)   Assistive Device (Bed Mobility) leg    Comment (Bed Mobility) Overall min A and increased time for bed mobility. Relies on rails and vc's to place feet on floor. Increased success with leg  to move about bed.   Transfers   Transfers sit-stand transfer;toilet transfer   Sit-Stand Transfer   Sit-Stand [place] (Transfers) contact guard   Assistive Device (Sit-Stand Transfers) walker, standard   Toilet Transfer   Type (Toilet Transfer) sit-stand   [place] Level (Toilet Transfer) minimum assist (75% patient effort)   Assistive Device (Toilet Transfer) grab bars/safety frame   Toilet Transfer Comments Min A for balance and controlled descent and vc's to kick out L knee and prevent bending   Activities of Daily Living   BADL Assessment lower body dressing;upper body dressing;bathing   Bathing Assessment   [place]  Level (Bathing) moderate assist (50% patient effort)   Position (Bathing) edge of bed sitting   Comment (Bathing) Bathing wipes, assist to wash bath and buttocks   Upper Body Dressing Assessment   [place] Level (Upper Body Dressing) moderate assist (50% patient effort)   Position (Upper Body Dressing) edge of bed sitting   Comment (Upper Body Dressing) To tie gown in back   Lower Body Dressing Assessment   [place] Level (Lower Body Dressing) maximum assist (25% patient effort)   Comment (Lower Body Dressing) Anticipate max A due to immobilizer   Clinical Impression   Criteria for Skilled Therapeutic Interventions Met (OT) yes;meets criteria   OT Diagnosis Decreased independence following L TKA   OT Problem List-Impairments impacting ADL problems related to;activity tolerance impaired;post-surgical precautions   ADL comments/analysis Pt overall mod A with ADLs at this time s/p L TKA. Pt would benefit from assist with all ADL/IADL until progressing with strength and mobility with knee.    Assessment of Occupational Performance 1-3 Performance Deficits   Identified Performance Deficits Dressing, bathing, home management, functional ambulation   Planned Therapy Interventions (OT) ADL retraining;strengthening;transfer training;home program guidelines;progressive activity/exercise   Clinical Decision Making Complexity (OT) low complexity   Therapy Frequency (OT) 5x/week   Predicted Duration of Therapy 3 days   Anticipated Equipment Needs Upon Discharge (OT)   (Has needed equipment )   Risk & Benefits of therapy have been explained care plan/treatment goals reviewed;current/potential barriers reviewed;participants voiced agreement with care plan   OT Discharge Planning    OT Discharge Recommendation (DC Rec) Home with assist   OT Rationale for DC Rec At this time, pt ok to d/c home with assist from daughter/ with all ADL/IADLs during recovery process - with special attention in lower body dressing and  bathing. Pt with slowed functional ambulation therefore posing a fall risk - would benefit from supervision at all times.    OT Brief overview of current status  Pt nauseous with emesis at this session. Marked improvement with intake of water and food. States that she is not comfortable leaving today, and wants to be medically stable.    Total Evaluation Time (Minutes)   Total Evaluation Time (Minutes) 8

## 2021-11-20 NOTE — PROGRESS NOTES
11/19/21 1728   Quick Adds   Type of Visit Initial PT Evaluation   Living Environment   People in home child(ewelina), adult;significant other   Current Living Arrangements house   Home Accessibility stairs to enter home   Number of Stairs, Main Entrance 2   Stair Railings, Main Entrance none   Living Environment Comments planning to live with her daughter for a while after this.    Self-Care   Usual Activity Tolerance good   Current Activity Tolerance moderate   Equipment Currently Used at Home raised toilet seat;cane, straight   Disability/Function   Hearing Difficulty or Deaf no   Concentrating, Remembering or Making Decisions Difficulty no   Difficulty Communicating no   Difficulty Eating/Swallowing no   Walking or Climbing Stairs Difficulty no   Dressing/Bathing Difficulty yes   Dressing/Bathing bathing difficulty, assistance 1 person   Dressing/Bathing Management  assist with lower body dressing at baseline    Toileting issues no   Doing Errands Independently Difficulty (such as shopping) no   Fall history within last six months no   Change in Functional Status Since Onset of Current Illness/Injury yes   General Information   Onset of Illness/Injury or Date of Surgery 11/19/21   Referring Physician Adelaida Osborne MD    Patient/Family Therapy Goals Statement (PT) to get better    Pertinent History of Current Problem (include personal factors and/or comorbidities that impact the POC) REVISION LEFT TOTAL KNEE ARTHROPLASTY   Existing Precautions/Restrictions other (see comments)  (no knee flexion, L knee immobilzer )   Weight-Bearing Status - LLE weight-bearing as tolerated   Weight-Bearing Status - RLE full weight-bearing   General Observations in surge unit, medically appropriate for PT    Cognition   Orientation Status (Cognition) oriented x 4   Pain Assessment   Patient Currently in Pain Yes, see Vital Sign flowsheet   Range of Motion (ROM)   ROM Comment NO L KNEE FLEXION-- per orders   Bed Mobility    Comment (Bed Mobility) SBA, moves well. L knee immobilizer donned in supine.    Transfers   Transfer Safety Comments to FWW, SBA-CGA. Follows verbal cuing well    Gait/Stairs (Locomotion)   [place] Level (Gait) supervision   Assistive Device (Gait) walker, front-wheeled   Distance in Feet (Required for LE Total Joints) 30ft (with multiple turns)    Deviations/Abnormal Patterns (Gait) antalgic   Comment (Gait/Stairs) with knee immobilizer on, steady. Distance limited due to space.     Balance   Balance no deficits were identified   Sensory Examination   Sensory Perception Comments reports L foot numbness/tingling   Coordination   Coordination no deficits were identified   Clinical Impression   Criteria for Skilled Therapeutic Intervention yes, treatment indicated   PT Diagnosis (PT) impaired functional mobility   Influenced by the following impairments decreased strength, pain, orthopedic restrictions    Functional limitations due to impairments transfers, gait, stairs    Clinical Presentation Stable/Uncomplicated   Clinical Presentation Rationale clinical judgement   Clinical Decision Making (Complexity) low complexity   Therapy Frequency (PT) Daily   Predicted Duration of Therapy Intervention (days/wks) 3 days   Planned Therapy Interventions (PT) gait training;strengthening;stair training;transfer training   Anticipated Equipment Needs at Discharge (PT) walker, rolling   Risk & Benefits of therapy have been explained patient;evaluation/treatment results reviewed;care plan/treatment goals reviewed;risks/benefits reviewed   PT Discharge Planning    PT Rationale for DC Rec SBA-SUP for functional mobility; anticipated to be able to perform ambulation and stairs with the amount of help she will have at home.    Total Evaluation Time   Total Evaluation Time (Minutes) 25       Mary Reyes, PT

## 2021-11-20 NOTE — PROGRESS NOTES
11/20/21 0800   Quick Adds   Type of Visit Initial Occupational Therapy Evaluation   Living Environment   People in home child(ewelina), adult;significant other   Current Living Arrangements house   Home Accessibility stairs to enter home   Number of Stairs, Main Entrance 2   Stair Railings, Main Entrance none   Transportation Anticipated family or friend will provide   Living Environment Comments planning to live with daughter in [place] before returning home to [place]. States that she plans on taking sponge baths until the immobilizer comes off, then getting assistance from  when returning home. Walk in shower present in home in [place]   Self-Care   Usual Activity Tolerance good   Current Activity Tolerance moderate   Regular Exercise Yes   Equipment Currently Used at Home raised toilet seat;cane, straight   Activity/Exercise/Self-Care Comment Regular exercise, states that she uses equipment at home. Plans to get assistance in shower and heavy IADLs   Disability/Function   Hearing Difficulty or Deaf no   Wear Glasses or Blind yes   Vision Management glasses   Concentrating, Remembering or Making Decisions Difficulty no   Difficulty Communicating no   Difficulty Eating/Swallowing no   Walking or Climbing Stairs Difficulty no   Dressing/Bathing Difficulty yes   Dressing/Bathing bathing difficulty, assistance 1 person   Dressing/Bathing Management  recently assists with LBD at baseline   Toileting issues no   Doing Errands Independently Difficulty (such as shopping) no   Fall history within last six months no   General Information   Onset of Illness/Injury or Date of Surgery 11/19/21   Referring Physician Saul Johnson   Patient/Family Therapy Goal Statement (OT) to go home medically stable   Additional Occupational Profile Info/Pertinent History of Current Problem  Aseptic loosening, left total knee arthroplasty. WBAT LLE, Immbolizer on   Existing Precautions/Restrictions fall;other (see comments)  (no  bending L knee)   Left Lower Extremity (Weight-bearing Status) weight-bearing as tolerated (WBAT)   Cognitive Status Examination   Orientation Status orientation to person, place and time   Affect/Mental Status (Cognitive) WFL   Follows Commands WFL   Visual Perception   Visual Impairment/Limitations corrective lenses full-time   Pain Assessment   Patient Currently in Pain Yes, see Vital Sign flowsheet  (5/10 L knee and IV location)   Posture   Posture forward head position   Range of Motion Comprehensive   General Range of Motion bilateral upper extremity ROM WFL   Strength Comprehensive (MMT)   General Manual Muscle Testing (MMT) Assessment no strength deficits identified   Coordination   Upper Extremity Coordination No deficits were identified   Bed Mobility   Bed Mobility rolling left;rolling right;supine-sit;sit-supine   Rolling Left [place] (Bed Mobility) supervision   Rolling Right [place] (Bed Mobility) supervision   Supine-Sit [place] (Bed Mobility) minimum assist (75% patient effort)   Sit-Supine [place] (Bed Mobility) minimum assist (75% patient effort)   Assistive Device (Bed Mobility) leg    Comment (Bed Mobility) Overall min A and increased time for bed mobility. Relies on rails and vc's to place feet on floor. Increased success with leg  to move about bed.   Transfers   Transfers sit-stand transfer;toilet transfer   Sit-Stand Transfer   Sit-Stand [place] (Transfers) contact guard   Assistive Device (Sit-Stand Transfers) walker, standard   Toilet Transfer   Type (Toilet Transfer) sit-stand   [place] Level (Toilet Transfer) minimum assist (75% patient effort)   Assistive Device (Toilet Transfer) grab bars/safety frame   Toilet Transfer Comments Min A for balance and controlled descent and vc's to kick out L knee and prevent bending   Activities of Daily Living   BADL Assessment lower body dressing;upper body dressing;bathing   Bathing Assessment   [place]  Level (Bathing) moderate assist (50% patient effort)   Position (Bathing) edge of bed sitting   Comment (Bathing) Bathing wipes, assist to wash bath and buttocks   Upper Body Dressing Assessment   [place] Level (Upper Body Dressing) moderate assist (50% patient effort)   Position (Upper Body Dressing) edge of bed sitting   Comment (Upper Body Dressing) To tie gown in back   Lower Body Dressing Assessment   [place] Level (Lower Body Dressing) maximum assist (25% patient effort)   Comment (Lower Body Dressing) Anticipate max A due to immobilizer   Clinical Impression   Criteria for Skilled Therapeutic Interventions Met (OT) yes;meets criteria   OT Diagnosis Decreased independence following L TKA   OT Problem List-Impairments impacting ADL problems related to;activity tolerance impaired;post-surgical precautions   ADL comments/analysis Pt overall mod A with ADLs at this time s/p L TKA. Pt would benefit from assist with all ADL/IADL until progressing with strength and mobility with knee.    Assessment of Occupational Performance 1-3 Performance Deficits   Identified Performance Deficits Dressing, bathing, home management, functional ambulation   Planned Therapy Interventions (OT) ADL retraining;strengthening;transfer training;home program guidelines;progressive activity/exercise   Clinical Decision Making Complexity (OT) low complexity   Therapy Frequency (OT) Daily   Predicted Duration of Therapy 3 days   Anticipated Equipment Needs Upon Discharge (OT)   (Has needed equipment )   Risk & Benefits of therapy have been explained care plan/treatment goals reviewed;current/potential barriers reviewed;participants voiced agreement with care plan   OT Discharge Planning    OT Discharge Recommendation (DC Rec) Home with assist   OT Rationale for DC Rec At this time, pt ok to d/c home with assist from daughter/ with all ADL/IADLs during recovery process - with special attention in lower body dressing and  bathing. Pt with slowed functional ambulation therefore posing a fall risk - would benefit from supervision at all times.    OT Brief overview of current status  Pt nauseous with emesis at this session. Marked improvement with intake of water and food. States that she is not comfortable leaving today, and wants to be medically stable.    Total Evaluation Time (Minutes)   Total Evaluation Time (Minutes) 8

## 2021-11-21 ENCOUNTER — APPOINTMENT (OUTPATIENT)
Dept: OCCUPATIONAL THERAPY | Facility: CLINIC | Age: 71
End: 2021-11-21
Attending: ORTHOPAEDIC SURGERY
Payer: MEDICARE

## 2021-11-21 VITALS
BODY MASS INDEX: 32.6 KG/M2 | HEIGHT: 63 IN | OXYGEN SATURATION: 91 % | HEART RATE: 104 BPM | WEIGHT: 184 LBS | RESPIRATION RATE: 16 BRPM | DIASTOLIC BLOOD PRESSURE: 68 MMHG | SYSTOLIC BLOOD PRESSURE: 118 MMHG | TEMPERATURE: 98.8 F

## 2021-11-21 LAB
GLUCOSE BLDC GLUCOMTR-MCNC: 93 MG/DL (ref 70–99)
HGB BLD-MCNC: 10.5 G/DL (ref 11.7–15.7)

## 2021-11-21 PROCEDURE — 250N000013 HC RX MED GY IP 250 OP 250 PS 637: Performed by: PHYSICIAN ASSISTANT

## 2021-11-21 PROCEDURE — 250N000011 HC RX IP 250 OP 636: Performed by: PHYSICIAN ASSISTANT

## 2021-11-21 PROCEDURE — 97535 SELF CARE MNGMENT TRAINING: CPT | Mod: GO

## 2021-11-21 PROCEDURE — 82962 GLUCOSE BLOOD TEST: CPT

## 2021-11-21 PROCEDURE — 85018 HEMOGLOBIN: CPT | Performed by: PHYSICIAN ASSISTANT

## 2021-11-21 PROCEDURE — 36415 COLL VENOUS BLD VENIPUNCTURE: CPT | Performed by: PHYSICIAN ASSISTANT

## 2021-11-21 RX ADMIN — SENNOSIDES AND DOCUSATE SODIUM 1 TABLET: 50; 8.6 TABLET ORAL at 08:28

## 2021-11-21 RX ADMIN — OXYCODONE HYDROCHLORIDE 10 MG: 5 TABLET ORAL at 02:42

## 2021-11-21 RX ADMIN — OXYCODONE HYDROCHLORIDE 10 MG: 5 TABLET ORAL at 06:56

## 2021-11-21 RX ADMIN — ONDANSETRON 4 MG: 4 TABLET, ORALLY DISINTEGRATING ORAL at 12:06

## 2021-11-21 RX ADMIN — ACETAMINOPHEN 975 MG: 325 TABLET, FILM COATED ORAL at 00:00

## 2021-11-21 RX ADMIN — OXYCODONE HYDROCHLORIDE 10 MG: 5 TABLET ORAL at 12:01

## 2021-11-21 RX ADMIN — ACETAMINOPHEN 975 MG: 325 TABLET, FILM COATED ORAL at 08:27

## 2021-11-21 RX ADMIN — ASPIRIN 325 MG: 325 TABLET, COATED ORAL at 08:27

## 2021-11-21 NOTE — PROGRESS NOTES
A/OX4. POD # 1 s/p Revision LTKA. LLE w/ immobilizer, CMS intact. Pain managed w/ PRN oxycodone. Using home CPAP. No nausea. Ambulating to the BR w/ A1/GB/Walker. Likely to discharge home tomorrow.

## 2021-11-21 NOTE — PROGRESS NOTES
11/20/21; [id number]    POD #1 L-TKA revision; A1 w/ gb/walker; A&O X4; PRN Oxy given X1 for 7/10 incisional pain; VSS on RA; reg diet with good output.

## 2021-11-21 NOTE — PROGRESS NOTES
[facility]      OUTPATIENT OCCUPATIONAL THERAPY  EVALUATION  PLAN OF TREATMENT FOR OUTPATIENT REHABILITATION  (COMPLETE FOR INITIAL CLAIMS ONLY)  Patient's Last Name, First Name, M.I.  YOB: 1950  Bee Andrews                          Provider's Name  [facility] Medical Record No.  [id number]                               Onset Date:  11/19/21   Start of Care Date:  11/20/21     Type:     ___PT   _X_OT   ___SLP Medical Diagnosis:  Decreased independence s/p L TKA                        OT Diagnosis:  Decreased independence following L TKA   Visits from SOC:  1   _________________________________________________________________________________  Plan of Treatment/Functional Goals    Planned Interventions: ADL retraining,strengthening,transfer training,home program guidelines,progressive activity/exercise   Goals: See Occupational Therapy Goals on Care Plan in [employer] electronic health record.    Therapy Frequency: Daily  Predicted Duration of Therapy Intervention: 3 days  _________________________________________________________________________________    I CERTIFY THE NEED FOR THESE SERVICES FURNISHED UNDER        THIS PLAN OF TREATMENT AND WHILE UNDER MY CARE     (Physician co-signature of this document indicates review and certification of the therapy plan).                Certification date from: 11/20/21, Certification date to: 11/27/21    Referring Physician: Adelaida Osborne MD           Initial Assessment        See Occupational Therapy evaluation dated 11/20/21 in Epic electronic health record.

## 2021-11-21 NOTE — PROGRESS NOTES
Writer spoke to Dr. Romero who states it is ok to discharge patient today per orders written yesterday.

## 2021-11-21 NOTE — PROGRESS NOTES
Patient vital signs are at baseline: Yes  Patient able to ambulate as they were prior to admission or with assist devices provided by therapies during their stay:  Yes  Patient MUST void prior to discharge:  Yes  Patient able to tolerate oral intake:  Yes  Pain has adequate pain control using Oral analgesics:  Yes

## 2021-11-21 NOTE — PROGRESS NOTES
Patient vital signs are at baseline: Yes  Patient able to ambulate as they were prior to admission or with assist devices provided by therapies during their stay:  Yes  Patient MUST void prior to discharge:  Yes  Patient able to tolerate oral intake:  Yes  Pain has adequate pain control using Oral analgesics:  Yes    Met with pt to go over discharge instructions. Provided pt with all of her discharge medications.

## 2021-11-21 NOTE — PLAN OF CARE
POD # 2 s/p Revision LTKA. A&Ox4, pleasant. LLE w/ immobilizer, CMS intact. Pain managed w/ PRN oxycodone x2. Using home CPAP overnight. No reports of nausea, takes oxycodone w/ food. Ax1 GB/W, voiding in bathroom. Tolerating regular diet. Likely to discharge to [facility] home today.

## 2021-11-24 NOTE — PROGRESS NOTES
[facility]      OUTPATIENT PHYSICAL THERAPY EVALUATION  PLAN OF TREATMENT FOR OUTPATIENT REHABILITATION  (COMPLETE FOR INITIAL CLAIMS ONLY)  Patient's Last Name, First Name, M.I.  YOB: 1950  Bee Andrews                        Provider's Name  [facility] Medical Record No.  [id number]                               Onset Date:  11/19/21   Start of Care Date:         Type:     _X_PT   ___OT   ___SLP Medical Diagnosis:                           PT Diagnosis:  impaired functional mobility   Visits from SOC:  1   _________________________________________________________________________________  Plan of Treatment/Functional Goals    Planned Interventions: gait training,strengthening,stair training,transfer training     Goals: See Physical Therapy Goals on Care Plan in [facility] electronic health record.    Therapy Frequency: Daily  Predicted Duration of Therapy Intervention: 3 days  _________________________________________________________________________________    I CERTIFY THE NEED FOR THESE SERVICES FURNISHED UNDER        THIS PLAN OF TREATMENT AND WHILE UNDER MY CARE     (Physician co-signature of this document indicates review and certification of the therapy plan).                 ,      Referring Physician: Adelaida Osborne MD             Initial Assessment        See Physical Therapy evaluation dated   in Epic electronic health record.

## 2021-12-01 ENCOUNTER — DOCUMENTATION ONLY (OUTPATIENT)
Dept: OTHER | Facility: CLINIC | Age: 71
End: 2021-12-01
Payer: COMMERCIAL

## 2022-03-10 DIAGNOSIS — Z11.59 ENCOUNTER FOR SCREENING FOR OTHER VIRAL DISEASES: Primary | ICD-10-CM

## 2022-04-01 ENCOUNTER — TRANSFERRED RECORDS (OUTPATIENT)
Dept: HEALTH INFORMATION MANAGEMENT | Facility: CLINIC | Age: 72
End: 2022-04-01
Payer: COMMERCIAL

## 2022-04-01 LAB
CREATININE (EXTERNAL): 0.63 MG/DL (ref 0.55–1.02)
GFR ESTIMATED (EXTERNAL): 89 ML/MIN/1.73M2
GLUCOSE (EXTERNAL): 98 MG/DL (ref 70–100)
INR (EXTERNAL): 1 (ref 0.9–1.1)
POTASSIUM (EXTERNAL): 4.3 MMOL/L (ref 3.5–5.1)

## 2022-04-18 ENCOUNTER — LAB (OUTPATIENT)
Dept: URGENT CARE | Facility: URGENT CARE | Age: 72
End: 2022-04-18
Attending: ORTHOPAEDIC SURGERY
Payer: MEDICARE

## 2022-04-18 DIAGNOSIS — Z11.59 ENCOUNTER FOR SCREENING FOR OTHER VIRAL DISEASES: ICD-10-CM

## 2022-04-18 PROCEDURE — U0005 INFEC AGEN DETEC AMPLI PROBE: HCPCS

## 2022-04-18 PROCEDURE — U0003 INFECTIOUS AGENT DETECTION BY NUCLEIC ACID (DNA OR RNA); SEVERE ACUTE RESPIRATORY SYNDROME CORONAVIRUS 2 (SARS-COV-2) (CORONAVIRUS DISEASE [COVID-19]), AMPLIFIED PROBE TECHNIQUE, MAKING USE OF HIGH THROUGHPUT TECHNOLOGIES AS DESCRIBED BY CMS-2020-01-R: HCPCS

## 2022-04-19 LAB — SARS-COV-2 RNA RESP QL NAA+PROBE: NEGATIVE

## 2022-04-19 RX ORDER — ACETAMINOPHEN 500 MG
1000 TABLET ORAL EVERY 6 HOURS PRN
COMMUNITY

## 2022-04-19 NOTE — PROGRESS NOTES
PTA medications updated by Medication Scribe prior to surgery via phone call with patient (last doses completed by Nurse)     Medication history sources: Patient, Surescripts and Patient's home med list  In the past week, patient estimated taking medication this percent of the time: Greater than 90%  Adherence assessment: N/A Not Observed    Significant changes made to the medication list:  Patient reports no longer taking the following meds (med scribe removed from PTA med list): Aspirin, Fish Oil, Ketoconazle Shampoo, Ondansetron, Oxycodone, Senna-Docusate      Additional medication history information:   Patient advised to bring: Refresh Tears Eye Drops    Medication reconciliation completed by provider prior to medication history? No    Time spent in this activity: 40 minutes    The information provided in this note is only as accurate as the sources available at the time of update(s)    Prior to Admission medications    Medication Sig Last Dose Taking? Auth Provider   acetaminophen (TYLENOL) 500 MG tablet Take 1,000 mg by mouth every 6 hours as needed for mild pain (2 x 500 mg = 1000 mg)  at prn Yes Reported, Patient   amoxicillin (AMOXIL) 500 MG capsule Take 2,000 mg by mouth once as needed (one hour prior to dental appoinment 4 x 500 mg = 2000 mg)  at prn Yes Reported, Patient   Calcium-Magnesium-Vitamin D (CALCIUM 1200+D3 PO) Take 1 capsule by mouth daily 4/19/2022 at am Yes Reported, Patient   Carboxymethylcellulose Sodium (REFRESH TEARS OP) Place 1 drop into both eyes 3 times daily as needed 4/19/2022 at am Yes Reported, Patient   ezetimibe (ZETIA) 10 MG tablet Take 10 mg by mouth At Bedtime 4/19/2022 at pm Yes Reported, Patient   Flaxseed, Linseed, (FLAXSEED OIL) 1000 MG CAPS Take 1 capsule by mouth daily 4/19/2022 at am Yes Reported, Patient   loratadine (CLARITIN) 10 MG tablet Take 1-2 tablets by mouth daily as needed for allergies  at prn Yes Reported, Patient   Multiple Vitamins-Minerals (PRESERVISION  AREDS 2 PO) Take 1 capsule by mouth 2 times daily 4/19/2022 at am Yes Reported, Patient   multivitamin (CENTRUM SILVER) tablet Take 1 tablet by mouth daily 4/19/2022 at am Yes Reported, Patient   omeprazole (PRILOSEC) 20 MG DR capsule Take 20 mg by mouth daily 4/19/2022 at am Yes Reported, Patient   Phenylephrine-APAP-guaiFENesin (TYLENOL SINUS SEVERE PO) Take 1 tablet by mouth daily as needed  at prn Yes Reported, Patient   Turmeric 500 MG TABS Take 1 tablet by mouth daily 4/19/2022 at am Yes Reported, Patient     Medication history completed by:    Clark Hernandez CPhT  Medication [facility]

## 2022-04-20 ENCOUNTER — ANESTHESIA (OUTPATIENT)
Dept: SURGERY | Facility: CLINIC | Age: 72
DRG: 468 | End: 2022-04-20
Payer: MEDICARE

## 2022-04-20 ENCOUNTER — ANESTHESIA EVENT (OUTPATIENT)
Dept: SURGERY | Facility: CLINIC | Age: 72
DRG: 468 | End: 2022-04-20
Payer: MEDICARE

## 2022-04-20 ENCOUNTER — APPOINTMENT (OUTPATIENT)
Dept: PHYSICAL THERAPY | Facility: CLINIC | Age: 72
DRG: 468 | End: 2022-04-20
Attending: ORTHOPAEDIC SURGERY
Payer: MEDICARE

## 2022-04-20 ENCOUNTER — HOSPITAL ENCOUNTER (INPATIENT)
Facility: CLINIC | Age: 72
LOS: 1 days | Discharge: HOME | DRG: 468 | End: 2022-04-21
Attending: ORTHOPAEDIC SURGERY | Admitting: ORTHOPAEDIC SURGERY
Payer: MEDICARE

## 2022-04-20 DIAGNOSIS — Z96.651 S/P REVISION OF TOTAL KNEE, RIGHT: Primary | ICD-10-CM

## 2022-04-20 PROCEDURE — C1776 JOINT DEVICE (IMPLANTABLE): HCPCS | Performed by: ORTHOPAEDIC SURGERY

## 2022-04-20 PROCEDURE — 97530 THERAPEUTIC ACTIVITIES: CPT | Mod: GP

## 2022-04-20 PROCEDURE — 258N000001 HC RX 258: Performed by: ORTHOPAEDIC SURGERY

## 2022-04-20 PROCEDURE — 250N000013 HC RX MED GY IP 250 OP 250 PS 637: Performed by: PHYSICIAN ASSISTANT

## 2022-04-20 PROCEDURE — 258N000003 HC RX IP 258 OP 636: Performed by: ANESTHESIOLOGY

## 2022-04-20 PROCEDURE — 120N000001 HC R&B MED SURG/OB

## 2022-04-20 PROCEDURE — 258N000003 HC RX IP 258 OP 636: Performed by: PHYSICIAN ASSISTANT

## 2022-04-20 PROCEDURE — 250N000011 HC RX IP 250 OP 636: Performed by: PHYSICIAN ASSISTANT

## 2022-04-20 PROCEDURE — 710N000009 HC RECOVERY PHASE 1, LEVEL 1, PER MIN: Performed by: ORTHOPAEDIC SURGERY

## 2022-04-20 PROCEDURE — 97116 GAIT TRAINING THERAPY: CPT | Mod: GP

## 2022-04-20 PROCEDURE — 272N000001 HC OR GENERAL SUPPLY STERILE: Performed by: ORTHOPAEDIC SURGERY

## 2022-04-20 PROCEDURE — 97161 PT EVAL LOW COMPLEX 20 MIN: CPT | Mod: GP

## 2022-04-20 PROCEDURE — 250N000011 HC RX IP 250 OP 636: Performed by: NURSE ANESTHETIST, CERTIFIED REGISTERED

## 2022-04-20 PROCEDURE — 999N000141 HC STATISTIC PRE-PROCEDURE NURSING ASSESSMENT: Performed by: ORTHOPAEDIC SURGERY

## 2022-04-20 PROCEDURE — 370N000017 HC ANESTHESIA TECHNICAL FEE, PER MIN: Performed by: ORTHOPAEDIC SURGERY

## 2022-04-20 PROCEDURE — 250N000009 HC RX 250: Performed by: NURSE ANESTHETIST, CERTIFIED REGISTERED

## 2022-04-20 PROCEDURE — 360N000078 HC SURGERY LEVEL 5, PER MIN: Performed by: ORTHOPAEDIC SURGERY

## 2022-04-20 PROCEDURE — 250N000009 HC RX 250: Performed by: ORTHOPAEDIC SURGERY

## 2022-04-20 PROCEDURE — 271N000001 HC OR GENERAL SUPPLY NON-STERILE: Performed by: ORTHOPAEDIC SURGERY

## 2022-04-20 PROCEDURE — 0SPC0JZ REMOVAL OF SYNTHETIC SUBSTITUTE FROM RIGHT KNEE JOINT, OPEN APPROACH: ICD-10-PCS | Performed by: ORTHOPAEDIC SURGERY

## 2022-04-20 PROCEDURE — 258N000003 HC RX IP 258 OP 636: Performed by: NURSE ANESTHETIST, CERTIFIED REGISTERED

## 2022-04-20 PROCEDURE — 0SRC0JZ REPLACEMENT OF RIGHT KNEE JOINT WITH SYNTHETIC SUBSTITUTE, OPEN APPROACH: ICD-10-PCS | Performed by: ORTHOPAEDIC SURGERY

## 2022-04-20 PROCEDURE — 250N000011 HC RX IP 250 OP 636: Performed by: ANESTHESIOLOGY

## 2022-04-20 RX ORDER — HYDROMORPHONE HCL IN WATER/PF 6 MG/30 ML
0.2 PATIENT CONTROLLED ANALGESIA SYRINGE INTRAVENOUS
Status: DISCONTINUED | OUTPATIENT
Start: 2022-04-20 | End: 2022-04-21 | Stop reason: HOSPADM

## 2022-04-20 RX ORDER — ACETAMINOPHEN 325 MG/1
975 TABLET ORAL EVERY 8 HOURS
Status: DISCONTINUED | OUTPATIENT
Start: 2022-04-20 | End: 2022-04-21 | Stop reason: HOSPADM

## 2022-04-20 RX ORDER — HYDROMORPHONE HCL IN WATER/PF 6 MG/30 ML
0.4 PATIENT CONTROLLED ANALGESIA SYRINGE INTRAVENOUS
Status: DISCONTINUED | OUTPATIENT
Start: 2022-04-20 | End: 2022-04-21 | Stop reason: HOSPADM

## 2022-04-20 RX ORDER — FENTANYL CITRATE 0.05 MG/ML
50 INJECTION, SOLUTION INTRAMUSCULAR; INTRAVENOUS
Status: DISCONTINUED | OUTPATIENT
Start: 2022-04-20 | End: 2022-04-20 | Stop reason: HOSPADM

## 2022-04-20 RX ORDER — OXYCODONE HYDROCHLORIDE 5 MG/1
5 TABLET ORAL EVERY 4 HOURS PRN
Status: DISCONTINUED | OUTPATIENT
Start: 2022-04-20 | End: 2022-04-20 | Stop reason: HOSPADM

## 2022-04-20 RX ORDER — ONDANSETRON 4 MG/1
4 TABLET, ORALLY DISINTEGRATING ORAL EVERY 6 HOURS PRN
Status: DISCONTINUED | OUTPATIENT
Start: 2022-04-20 | End: 2022-04-21 | Stop reason: HOSPADM

## 2022-04-20 RX ORDER — CEFAZOLIN SODIUM/WATER 2 G/20 ML
2 SYRINGE (ML) INTRAVENOUS
Status: COMPLETED | OUTPATIENT
Start: 2022-04-20 | End: 2022-04-20

## 2022-04-20 RX ORDER — CEFAZOLIN SODIUM 2 G/100ML
2 INJECTION, SOLUTION INTRAVENOUS EVERY 8 HOURS
Status: COMPLETED | OUTPATIENT
Start: 2022-04-20 | End: 2022-04-21

## 2022-04-20 RX ORDER — EZETIMIBE 10 MG/1
10 TABLET ORAL AT BEDTIME
Status: DISCONTINUED | OUTPATIENT
Start: 2022-04-20 | End: 2022-04-21 | Stop reason: HOSPADM

## 2022-04-20 RX ORDER — SODIUM CHLORIDE, SODIUM LACTATE, POTASSIUM CHLORIDE, CALCIUM CHLORIDE 600; 310; 30; 20 MG/100ML; MG/100ML; MG/100ML; MG/100ML
INJECTION, SOLUTION INTRAVENOUS CONTINUOUS
Status: DISCONTINUED | OUTPATIENT
Start: 2022-04-20 | End: 2022-04-20 | Stop reason: HOSPADM

## 2022-04-20 RX ORDER — ACETAMINOPHEN 325 MG/1
650 TABLET ORAL EVERY 4 HOURS PRN
Status: DISCONTINUED | OUTPATIENT
Start: 2022-04-23 | End: 2022-04-21 | Stop reason: HOSPADM

## 2022-04-20 RX ORDER — BISACODYL 10 MG
10 SUPPOSITORY, RECTAL RECTAL DAILY PRN
Status: DISCONTINUED | OUTPATIENT
Start: 2022-04-20 | End: 2022-04-21 | Stop reason: HOSPADM

## 2022-04-20 RX ORDER — LIDOCAINE 40 MG/G
CREAM TOPICAL
Status: DISCONTINUED | OUTPATIENT
Start: 2022-04-20 | End: 2022-04-21 | Stop reason: HOSPADM

## 2022-04-20 RX ORDER — ONDANSETRON 2 MG/ML
INJECTION INTRAMUSCULAR; INTRAVENOUS PRN
Status: DISCONTINUED | OUTPATIENT
Start: 2022-04-20 | End: 2022-04-20

## 2022-04-20 RX ORDER — LABETALOL HYDROCHLORIDE 5 MG/ML
10 INJECTION, SOLUTION INTRAVENOUS
Status: DISCONTINUED | OUTPATIENT
Start: 2022-04-20 | End: 2022-04-20 | Stop reason: HOSPADM

## 2022-04-20 RX ORDER — PANTOPRAZOLE SODIUM 40 MG/1
40 TABLET, DELAYED RELEASE ORAL DAILY
Status: DISCONTINUED | OUTPATIENT
Start: 2022-04-21 | End: 2022-04-21 | Stop reason: HOSPADM

## 2022-04-20 RX ORDER — AMOXICILLIN 250 MG
1 CAPSULE ORAL 2 TIMES DAILY
Status: DISCONTINUED | OUTPATIENT
Start: 2022-04-20 | End: 2022-04-21 | Stop reason: HOSPADM

## 2022-04-20 RX ORDER — OXYCODONE HYDROCHLORIDE 5 MG/1
5 TABLET ORAL EVERY 4 HOURS PRN
Status: DISCONTINUED | OUTPATIENT
Start: 2022-04-20 | End: 2022-04-21 | Stop reason: HOSPADM

## 2022-04-20 RX ORDER — NALOXONE HYDROCHLORIDE 0.4 MG/ML
0.4 INJECTION, SOLUTION INTRAMUSCULAR; INTRAVENOUS; SUBCUTANEOUS
Status: DISCONTINUED | OUTPATIENT
Start: 2022-04-20 | End: 2022-04-21 | Stop reason: HOSPADM

## 2022-04-20 RX ORDER — HYDROMORPHONE HCL IN WATER/PF 6 MG/30 ML
0.2 PATIENT CONTROLLED ANALGESIA SYRINGE INTRAVENOUS EVERY 5 MIN PRN
Status: DISCONTINUED | OUTPATIENT
Start: 2022-04-20 | End: 2022-04-20 | Stop reason: HOSPADM

## 2022-04-20 RX ORDER — LIDOCAINE 40 MG/G
CREAM TOPICAL
Status: DISCONTINUED | OUTPATIENT
Start: 2022-04-20 | End: 2022-04-20 | Stop reason: HOSPADM

## 2022-04-20 RX ORDER — FENTANYL CITRATE 50 UG/ML
INJECTION, SOLUTION INTRAMUSCULAR; INTRAVENOUS PRN
Status: DISCONTINUED | OUTPATIENT
Start: 2022-04-20 | End: 2022-04-20

## 2022-04-20 RX ORDER — CEFAZOLIN SODIUM/WATER 2 G/20 ML
2 SYRINGE (ML) INTRAVENOUS SEE ADMIN INSTRUCTIONS
Status: DISCONTINUED | OUTPATIENT
Start: 2022-04-20 | End: 2022-04-20 | Stop reason: HOSPADM

## 2022-04-20 RX ORDER — SODIUM CHLORIDE, SODIUM LACTATE, POTASSIUM CHLORIDE, CALCIUM CHLORIDE 600; 310; 30; 20 MG/100ML; MG/100ML; MG/100ML; MG/100ML
INJECTION, SOLUTION INTRAVENOUS CONTINUOUS
Status: DISCONTINUED | OUTPATIENT
Start: 2022-04-20 | End: 2022-04-21 | Stop reason: HOSPADM

## 2022-04-20 RX ORDER — ONDANSETRON 2 MG/ML
4 INJECTION INTRAMUSCULAR; INTRAVENOUS EVERY 30 MIN PRN
Status: DISCONTINUED | OUTPATIENT
Start: 2022-04-20 | End: 2022-04-20 | Stop reason: HOSPADM

## 2022-04-20 RX ORDER — NALOXONE HYDROCHLORIDE 0.4 MG/ML
0.2 INJECTION, SOLUTION INTRAMUSCULAR; INTRAVENOUS; SUBCUTANEOUS
Status: DISCONTINUED | OUTPATIENT
Start: 2022-04-20 | End: 2022-04-21 | Stop reason: HOSPADM

## 2022-04-20 RX ORDER — HYDRALAZINE HYDROCHLORIDE 20 MG/ML
2.5-5 INJECTION INTRAMUSCULAR; INTRAVENOUS EVERY 10 MIN PRN
Status: DISCONTINUED | OUTPATIENT
Start: 2022-04-20 | End: 2022-04-20 | Stop reason: HOSPADM

## 2022-04-20 RX ORDER — LIDOCAINE HYDROCHLORIDE 20 MG/ML
INJECTION, SOLUTION INFILTRATION; PERINEURAL PRN
Status: DISCONTINUED | OUTPATIENT
Start: 2022-04-20 | End: 2022-04-20

## 2022-04-20 RX ORDER — TRANEXAMIC ACID 650 MG/1
1950 TABLET ORAL ONCE
Status: COMPLETED | OUTPATIENT
Start: 2022-04-20 | End: 2022-04-20

## 2022-04-20 RX ORDER — ONDANSETRON 2 MG/ML
4 INJECTION INTRAMUSCULAR; INTRAVENOUS EVERY 6 HOURS PRN
Status: DISCONTINUED | OUTPATIENT
Start: 2022-04-20 | End: 2022-04-21 | Stop reason: HOSPADM

## 2022-04-20 RX ORDER — POLYETHYLENE GLYCOL 3350 17 G/17G
17 POWDER, FOR SOLUTION ORAL DAILY
Status: DISCONTINUED | OUTPATIENT
Start: 2022-04-21 | End: 2022-04-21 | Stop reason: HOSPADM

## 2022-04-20 RX ORDER — ONDANSETRON 4 MG/1
4 TABLET, ORALLY DISINTEGRATING ORAL EVERY 30 MIN PRN
Status: DISCONTINUED | OUTPATIENT
Start: 2022-04-20 | End: 2022-04-20 | Stop reason: HOSPADM

## 2022-04-20 RX ORDER — AMOXICILLIN 250 MG
1-2 CAPSULE ORAL 2 TIMES DAILY
Qty: 30 TABLET | Refills: 0 | Status: SHIPPED | OUTPATIENT
Start: 2022-04-20

## 2022-04-20 RX ORDER — PROCHLORPERAZINE MALEATE 5 MG
5 TABLET ORAL EVERY 6 HOURS PRN
Status: DISCONTINUED | OUTPATIENT
Start: 2022-04-20 | End: 2022-04-21 | Stop reason: HOSPADM

## 2022-04-20 RX ORDER — FENTANYL CITRATE 0.05 MG/ML
50 INJECTION, SOLUTION INTRAMUSCULAR; INTRAVENOUS EVERY 5 MIN PRN
Status: DISCONTINUED | OUTPATIENT
Start: 2022-04-20 | End: 2022-04-20 | Stop reason: HOSPADM

## 2022-04-20 RX ORDER — DIMENHYDRINATE 50 MG/ML
25 INJECTION, SOLUTION INTRAMUSCULAR; INTRAVENOUS
Status: DISCONTINUED | OUTPATIENT
Start: 2022-04-20 | End: 2022-04-20 | Stop reason: HOSPADM

## 2022-04-20 RX ORDER — HYDROXYZINE HYDROCHLORIDE 10 MG/1
10 TABLET, FILM COATED ORAL EVERY 6 HOURS PRN
Qty: 30 TABLET | Refills: 0 | Status: SHIPPED | OUTPATIENT
Start: 2022-04-20

## 2022-04-20 RX ORDER — ONDANSETRON 4 MG/1
4 TABLET, ORALLY DISINTEGRATING ORAL EVERY 8 HOURS PRN
Qty: 30 TABLET | Refills: 0 | Status: SHIPPED | OUTPATIENT
Start: 2022-04-20

## 2022-04-20 RX ORDER — ACETAMINOPHEN 325 MG/1
975 TABLET ORAL ONCE
Status: DISCONTINUED | OUTPATIENT
Start: 2022-04-20 | End: 2022-04-20 | Stop reason: HOSPADM

## 2022-04-20 RX ORDER — OXYCODONE HYDROCHLORIDE 5 MG/1
10 TABLET ORAL EVERY 4 HOURS PRN
Status: DISCONTINUED | OUTPATIENT
Start: 2022-04-20 | End: 2022-04-21 | Stop reason: HOSPADM

## 2022-04-20 RX ORDER — PROPOFOL 10 MG/ML
INJECTION, EMULSION INTRAVENOUS CONTINUOUS PRN
Status: DISCONTINUED | OUTPATIENT
Start: 2022-04-20 | End: 2022-04-20

## 2022-04-20 RX ORDER — HYDROXYZINE HYDROCHLORIDE 10 MG/1
10 TABLET, FILM COATED ORAL EVERY 6 HOURS PRN
Status: DISCONTINUED | OUTPATIENT
Start: 2022-04-20 | End: 2022-04-21 | Stop reason: HOSPADM

## 2022-04-20 RX ORDER — DEXAMETHASONE SODIUM PHOSPHATE 4 MG/ML
INJECTION, SOLUTION INTRA-ARTICULAR; INTRALESIONAL; INTRAMUSCULAR; INTRAVENOUS; SOFT TISSUE PRN
Status: DISCONTINUED | OUTPATIENT
Start: 2022-04-20 | End: 2022-04-20

## 2022-04-20 RX ORDER — ACETAMINOPHEN 500 MG
1000 TABLET ORAL EVERY 6 HOURS PRN
Status: DISCONTINUED | OUTPATIENT
Start: 2022-04-20 | End: 2022-04-20

## 2022-04-20 RX ORDER — LORATADINE 10 MG/1
10-20 TABLET ORAL DAILY PRN
Status: DISCONTINUED | OUTPATIENT
Start: 2022-04-20 | End: 2022-04-21 | Stop reason: HOSPADM

## 2022-04-20 RX ORDER — ACETAMINOPHEN 325 MG/1
650 TABLET ORAL EVERY 4 HOURS PRN
Qty: 100 TABLET | Refills: 0 | Status: SHIPPED | OUTPATIENT
Start: 2022-04-20

## 2022-04-20 RX ORDER — OXYCODONE HYDROCHLORIDE 5 MG/1
5-10 TABLET ORAL
Qty: 30 TABLET | Refills: 0 | Status: SHIPPED | OUTPATIENT
Start: 2022-04-20

## 2022-04-20 RX ADMIN — FENTANYL CITRATE 50 MCG: 50 INJECTION, SOLUTION INTRAMUSCULAR; INTRAVENOUS at 10:49

## 2022-04-20 RX ADMIN — Medication 2 G: at 10:45

## 2022-04-20 RX ADMIN — PHENYLEPHRINE HYDROCHLORIDE 100 MCG: 10 INJECTION INTRAVENOUS at 11:29

## 2022-04-20 RX ADMIN — EZETIMIBE 10 MG: 10 TABLET ORAL at 21:11

## 2022-04-20 RX ADMIN — DEXAMETHASONE SODIUM PHOSPHATE 4 MG: 4 INJECTION, SOLUTION INTRA-ARTICULAR; INTRALESIONAL; INTRAMUSCULAR; INTRAVENOUS; SOFT TISSUE at 11:07

## 2022-04-20 RX ADMIN — MIDAZOLAM HYDROCHLORIDE 2 MG: 1 INJECTION, SOLUTION INTRAMUSCULAR; INTRAVENOUS at 10:31

## 2022-04-20 RX ADMIN — ACETAMINOPHEN 975 MG: 325 TABLET ORAL at 15:36

## 2022-04-20 RX ADMIN — ACETAMINOPHEN 975 MG: 325 TABLET ORAL at 21:12

## 2022-04-20 RX ADMIN — PHENYLEPHRINE HYDROCHLORIDE 100 MCG: 10 INJECTION INTRAVENOUS at 11:23

## 2022-04-20 RX ADMIN — ASPIRIN 325 MG: 325 TABLET, COATED ORAL at 15:36

## 2022-04-20 RX ADMIN — PHENYLEPHRINE HYDROCHLORIDE 100 MCG: 10 INJECTION INTRAVENOUS at 11:08

## 2022-04-20 RX ADMIN — PHENYLEPHRINE HYDROCHLORIDE 100 MCG: 10 INJECTION INTRAVENOUS at 11:53

## 2022-04-20 RX ADMIN — OXYCODONE HYDROCHLORIDE 10 MG: 5 TABLET ORAL at 21:12

## 2022-04-20 RX ADMIN — SENNOSIDES AND DOCUSATE SODIUM 1 TABLET: 50; 8.6 TABLET ORAL at 21:12

## 2022-04-20 RX ADMIN — CEFAZOLIN SODIUM 2 G: 2 INJECTION, SOLUTION INTRAVENOUS at 18:41

## 2022-04-20 RX ADMIN — MIDAZOLAM 1 MG: 1 INJECTION INTRAMUSCULAR; INTRAVENOUS at 10:45

## 2022-04-20 RX ADMIN — SODIUM CHLORIDE, POTASSIUM CHLORIDE, SODIUM LACTATE AND CALCIUM CHLORIDE: 600; 310; 30; 20 INJECTION, SOLUTION INTRAVENOUS at 14:00

## 2022-04-20 RX ADMIN — LIDOCAINE HYDROCHLORIDE 60 MG: 20 INJECTION, SOLUTION INFILTRATION; PERINEURAL at 10:59

## 2022-04-20 RX ADMIN — PROPOFOL 70 MCG/KG/MIN: 10 INJECTION, EMULSION INTRAVENOUS at 10:59

## 2022-04-20 RX ADMIN — TRANEXAMIC ACID 1950 MG: 650 TABLET ORAL at 09:34

## 2022-04-20 RX ADMIN — SODIUM CHLORIDE, POTASSIUM CHLORIDE, SODIUM LACTATE AND CALCIUM CHLORIDE: 600; 310; 30; 20 INJECTION, SOLUTION INTRAVENOUS at 10:45

## 2022-04-20 RX ADMIN — PHENYLEPHRINE HYDROCHLORIDE 100 MCG: 10 INJECTION INTRAVENOUS at 11:17

## 2022-04-20 RX ADMIN — ONDANSETRON 4 MG: 2 INJECTION INTRAMUSCULAR; INTRAVENOUS at 11:07

## 2022-04-20 RX ADMIN — OXYCODONE HYDROCHLORIDE 10 MG: 5 TABLET ORAL at 15:36

## 2022-04-20 ASSESSMENT — ACTIVITIES OF DAILY LIVING (ADL)
ADLS_ACUITY_SCORE: 4
ADLS_ACUITY_SCORE: 10
ADLS_ACUITY_SCORE: 4
ADLS_ACUITY_SCORE: 12
ADLS_ACUITY_SCORE: 4
ADLS_ACUITY_SCORE: 4
ADLS_ACUITY_SCORE: 12
ADLS_ACUITY_SCORE: 4

## 2022-04-20 ASSESSMENT — LIFESTYLE VARIABLES: TOBACCO_USE: 0

## 2022-04-20 NOTE — PROGRESS NOTES
04/20/22 1800   Quick Adds   Type of Visit Initial PT Evaluation   Living Environment   People in Home spouse;child(ewelina), adult   Current Living Arrangements house  (single level)   Home Accessibility stairs to enter home   Number of Stairs, Main Entrance 3   Stair Railings, Main Entrance none   Transportation Anticipated family or friend will provide   Living Environment Comments Pt and spouse are staying in daughter's house at discharge, will have assist 24/7, 3 steps to enter   Self-Care   Usual Activity Tolerance good   Current Activity Tolerance moderate   Equipment Currently Used at Home cane, straight   Fall history within last six months no   Activity/Exercise/Self-Care Comment pt uses SEC most of the time at baseline, Ind with ADL's   General Information   Onset of Illness/Injury or Date of Surgery 04/20/22   Referring Physician Saul Johnson PA-C   Patient/Family Therapy Goals Statement (PT) go home with family assist, eventually return to [place] with asisst from spouse   Pertinent History of Current Problem (include personal factors and/or comorbidities that impact the POC) Pt is a 72 y.o. female s/p right TKA revision with polyethelyne exchange on 4/20/2022, POD#0   Existing Precautions/Restrictions fall;other (see comments);weight bearing  (no ROM on right knee, KI on at all times)   Weight-Bearing Status - RLE weight-bearing as tolerated   Cognition   Affect/Mental Status (Cognition) WNL   Orientation Status (Cognition) oriented x 4   Follows Commands (Cognition) WNL   Pain Assessment   Patient Currently in Pain   (3/10 right knee)   Posture    Posture Not impaired   Range of Motion (ROM)   Range of Motion ROM deficits secondary to surgical procedure   ROM Comment deficits with Right knee secondary to surgery and KI on at all times, otherwise appears grossly WFL   Strength (Manual Muscle Testing)   Strength (Manual Muscle Testing) Able to perform R SLR;Able to perform L SLR;Deficits observed  during functional mobility   Strength Comments not formally assessed, deficits with Right LE due to surgery   Bed Mobility   Comment, (Bed Mobility) supine>sit Mod-I   Transfers   Comment, (Transfers) sit<>stand with FWW CGA   Gait/Stairs (Locomotion)   [place] Level (Gait) contact guard   Assistive Device (Gait) walker, front-wheeled   Distance in Feet (Required for LE Total Joints) 10' + 150'   Pattern (Gait) step-through   Comment, (Gait/Stairs) amb with FWW and CGA   Balance   Balance Comments sitting EOB ind, standing with FWW good balance, deficits with dynamic balance due to surgery and KI restriction   Sensory Examination   Sensory Perception Comments pt denies any deficits   Clinical Impression   Criteria for Skilled Therapeutic Intervention Yes, treatment indicated   PT Diagnosis (PT) impaired gait and mobility   Influenced by the following impairments pain, deficits with functional ROM, strength, and balance   Functional limitations due to impairments fall risk, reduced activity tolerance, reduced Ind with functional mobility and ADL's, KI precaution   Clinical Presentation (PT Evaluation Complexity) Stable/Uncomplicated   Clinical Presentation Rationale PMH and clinical judgement   Clinical Decision Making (Complexity) low complexity   Planned Therapy Interventions (PT) balance training;bed mobility training;cryotherapy;gait training;home exercise program;patient/family education;ROM (range of motion);stair training;strengthening;stretching;transfer training;progressive activity/exercise   Anticipated Equipment Needs at Discharge (PT)   (pt will provide FWW)   Risk & Benefits of therapy have been explained care plan/treatment goals reviewed;evaluation/treatment results reviewed;risks/benefits reviewed;current/potential barriers reviewed;participants voiced agreement with care plan;participants included;patient   PT Discharge Planning   PT Discharge Recommendation (DC Rec) home with assist;home with  outpatient physical therapy   PT Rationale for DC Rec Pt is below baseline but is moving well and using good safety judgment and technique. Anticipate at discharge pt will be Mod-I with bed mobility, SBA with functional transfers using FWW, SBA amb with FWW, and Adam for steps. Pt and spouse will be staying in daughter's house and pt will have good support 24/7. Recommend home with assist and outpatient PT to progress pt safety and independence with functional mobility   PT Brief overview of current status bed mobility Mod-I, sit<>stand with FWW and CGA, amb with FWW CGA   Plan of Care Review   Plan of Care Reviewed With patient   Total Evaluation Time   Total Evaluation Time (Minutes) 5   Physical Therapy Goals   PT Frequency 2x/day   PT Predicted Duration/Target Date for Goal Attainment 04/21/22   PT Goals Bed Mobility;Transfers;Gait;Stairs   PT: Bed Mobility Modified independent;Supine to/from sit;Rolling;Bridging;Within precautions   PT: Transfers Supervision/stand-by assist;Sit to/from stand;Assistive device;Within precautions   PT: Gait Supervision/stand-by assist;Rolling walker;Greater than 200 feet;Within precautions   PT: Stairs Minimal assist;3 stairs;Within precautions

## 2022-04-20 NOTE — BRIEF OP NOTE
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  Complications: None.  Implants:   Implant Name Type Inv. Item Serial No.  Lot No. LRB No. Used Action   TIBIAL INSERT FIXED VBEARING CURVED PLUS    Depuy [id number] Right 1 Wasted   TIBIAL INSERT FIXED BEARING CURVED PLUS    Depuy [id number] Right [id number] Implanted

## 2022-04-20 NOTE — OP NOTE
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    PROCEDURE PERFORMED:  Revision right total knee arthroplasty with revision of the polyethylene.    INDICATIONS FOR PROCEDURE:  The patient is a 72-year-old female who had a right total knee arthroplasty done elsewhere.  She presented to me with complaints of knee pain, especially with difficulty with going up and down stairs.  I evaluated the patient and felt that her exam and symptoms were consistent with mid flexion instability.  X-rays demonstrated asymmetric wear of the polyethylene with thinning of the medial side of the polyethylene.  I explained to her the risks, benefits, and potential complications of both continued nonoperative treatment versus the above-stated procedure.  Discussion included but not specific to infection, vascular, neurologic complications, DVT, septic and aseptic loosening, arthrofibrosis, fracture, and the possible need for further revision surgery.  After this discussion, the patient wanted to proceed with surgery.    ANESTHETIC USED:  Spinal.    SURGEON:  Adelaida Osborne MD    ASSISTANT:  Jeison Horton PA-C.    DESCRIPTION OF PROCEDURE:  The patient was taken to the operating room and placed on the table in supine position.  After adequate induction of a spinal anesthetic, a bump was placed beneath the right hip.  Pneumatic tourniquet was applied to the right thigh.  The patient was given 3 g of Ancef for infection prophylaxis given 1 hour prior to incision.  We then performed sterile prep and drape of the right knee and right lower extremity.  After sterile prep and drape, the limb was exsanguinated and tourniquet was elevated to 300 mmHg.  I then made a midline incision using the old surgical scar, and sharply dissected down  onto the extensor mechanism and proceeded to perform a medial parapatellar arthrotomy.  We mobilized the patella, and we were able to gain exposure.  There was no evidence of infection.  We did release off the medial side of the tibia to help gain exposure.  We then removed the polyethylene, and there was significant wear on the medial side.  I then irrigated and checked the fixation of both the femoral, tibial, and patellar components.  They were well fixed, no evidence of aseptic loosening.  We then put in a trial conforming polyethylene that is for a cruciate retaining total knee.  Once it was in place, took the knee through a range of motion.  Patella tracked ideally.  We had very good soft tissue balancing in both flexion and extension.  We then removed the trial, irrigated with pulse jet lavage and put in the actual polyethylene.  Once this was in place, took the knee through a range of motion.  Patella tracked ideally.  We had good soft tissue balance in both flexion and extension.  We then placed the knee in approximately 30 degrees of flexion, irrigated with pulse jet lavage and soaked the knee in a dilute solution of Betadine for 3 minutes and irrigated with pulse jet lavage, used approximately 3 liters of antibiotic saline and pulse jet lavage.  We then repaired the arthrotomy using 0 Ethibond sutures, it was oversewn using 0 Stratafix.  The deep subQ was closed using 0 Vicryl, superficial subQ was closed with 2-0 Vicryl and skin was closed with a running 3-0 Monocryl subcuticular stitch followed by Prineo dressing.  Sterile dressing applied followed by knee immobilizer.  The patient tolerated the procedure.  There were no intraoperative complications.  The patient went to the [facility] in stable condition.    Plan will be for patient get 24 hours of Ancef for infection prophylaxis, 30 days of aspirin for DVT prophylaxis.    Adelaida Osborne MD        D: 04/20/2022   T: 04/20/2022   MT: ANDRÉS    Name:      DEBORAH RADHA BETHEAaThir  MRN:      [id number]        Account:        [id number]   :      1950           Procedure Date: 2022     Document: [id number]

## 2022-04-20 NOTE — ANESTHESIA POSTPROCEDURE EVALUATION
Patient: Bee Andrews    Procedure: Procedure(s):  RIGHT TOTAL KNEE REVISION WITH POLYETHYLENE EXCHANGE       Anesthesia Type:  Spinal    Note:     Postop Pain Control: Uneventful            Sign Out: Well controlled pain   PONV: No   Neuro/Psych: Uneventful            Sign Out: Acceptable/Baseline neuro status   Airway/Respiratory: Uneventful            Sign Out: Acceptable/Baseline resp. status   CV/Hemodynamics: Uneventful            Sign Out: Acceptable CV status   Other NRE: NONE   DID A NON-ROUTINE EVENT OCCUR? No           Last vitals:  Vitals Value Taken Time   /69 04/20/22 1330   Temp 36.3  C (97.4  F) 04/20/22 1230   Pulse 95 04/20/22 1337   Resp 26 04/20/22 1336   SpO2 93 % 04/20/22 1336   Vitals shown include unvalidated device data.    Electronically Signed By: Myles Hugo MD  April 20, 2022  3:36 PM

## 2022-04-20 NOTE — OR NURSING
RN contacted Dr. Osborne to see if imaging of R knee is needed in PACU, Dr. Osborne responded that there is no need for imaging at this time. Floor RN updated. Will continue to monitor.

## 2022-04-20 NOTE — PLAN OF CARE
Goal Outcome Evaluation:    Patient vital signs are at baseline: Yes  Patient able to ambulate as they were prior to admission or with assist devices provided by therapies during their stay:  No,  Reason:  PT following  Patient MUST void prior to discharge:  Yes  Patient able to tolerate oral intake:  Yes  Pain has adequate pain control using Oral analgesics:  Yes  Does patient have an identified :  Yes  Has goal D/C date and time been discussed with patient:  Yes

## 2022-04-20 NOTE — ANESTHESIA CARE TRANSFER NOTE
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    Anesthesia Type:   Spinal     Note:    Oropharynx: oropharynx clear of all foreign objects  Level of Consciousness: awake  Oxygen Supplementation: room air    Independent Airway: airway patency satisfactory and stable  Dentition: dentition unchanged  Vital Signs Stable: post-procedure vital signs reviewed and stable  Report to RN Given: handoff report given  Patient transferred to: PACU    Handoff Report: Identifed the Patient, Identified the Reponsible Provider, Reviewed the pertinent medical history, Discussed the surgical course, Reviewed Intra-OP anesthesia mangement and issues during anesthesia, Set expectations for post-procedure period and Allowed opportunity for questions and acknowledgement of understanding      Vitals:  Vitals Value Taken Time   BP     Temp     Pulse     Resp     SpO2         Electronically Signed By: DOE Chavis CRNA  April 20, 2022  12:32 PM

## 2022-04-20 NOTE — ANESTHESIA PROCEDURE NOTES
Adductor canal (targeting saphenous nerve) Procedure Note    Pre-Procedure   Staff -        Anesthesiologist:  Myles Hugo MD       Performed By: anesthesiologist       Location: pre-op       Pre-Anesthestic Checklist: patient identified, IV checked, site marked, risks and benefits discussed, informed consent, monitors and equipment checked, pre-op evaluation, at physician/surgeon's request and post-op pain management  Timeout:       Correct Patient: Yes        Correct Procedure: Yes        Correct Site: Yes        Correct Position: Yes        Correct Laterality: Yes        Site Marked: Yes  Procedure Documentation  Procedure: Adductor canal (targeting saphenous nerve)       Patient Position: supine       Skin prep: Chloraprep       Local skin infiltrated with 1 mL of 1% lidocaine.        Needle Type: insulated       Needle Gauge: 21.        Needle Length (millimeters): 90        Ultrasound guided       1. Ultrasound was used to identify targeted nerve, plexus, vascular marker, or fascial plane and place a needle adjacent to it in real-time.       2. Ultrasound was used to visualize the spread of anesthetic in close proximity to the above referenced structure.       3. A permanent image is entered into the patient's record.       4. The visualized anatomic structures appeared normal.       5. There were no apparent abnormal pathologic findings.    Assessment/Narrative         The placement was negative for: blood aspirated, painful injection and site bleeding       Paresthesias: No.       Bolus given via needle..        Secured via.        Insertion/Infusion Method: Single Shot       Complications: none     Comments:  Bolus via needle, 15 mL of 0.5% ropivacaine with 1:400,000 epinephrine.    Under ultrasound guidance, a 21 gauge needle was inserted and placed in close proximity to the saphenous nerve. Ultrasound was also used to visualize the spread of anesthetic in close proximity to the nerve being  blocked. The nerve appeared anatomically normal, and there were no apparent abnormal pathological findings. Patient tolerated well, was mildly sedated but communicative throughout the procedure. A permanent ultrasound image was saved in the patient's record.    The surgeon has given a verbal order transferring care of this patient to me for the performance of regional analgesia block for post op pain control. It is requested of me because I am uniquely trained and qualified to perform this block and the surgeon is neither trained nor qualified to perform this procedure.

## 2022-04-20 NOTE — ANESTHESIA PREPROCEDURE EVALUATION
Anesthesia Pre-Procedure Evaluation    Patient: Bee Andrews   MRN: [id number] : 1950        Procedure : Procedure(s):  RIGHT TOTAL KNEE REVISION WITH POLYETHYLENE EXCHANGE          Past Medical History:   Diagnosis Date     Fibromyalgia      Gastroesophageal reflux disease      Hyperlipidemia      Osteopenia      Sleep apnea      Tinnitus of both ears       Past Surgical History:   Procedure Laterality Date     ARTHROPLASTY REVISION KNEE Left 2021    Procedure: REVISION LEFT TOTAL KNEE ARTHROPLASTY;  Surgeon: Adelaida Osborne MD;  Location: [facility] OR     COLONOSCOPY       GYN SURGERY      tubal ligation     ORTHOPEDIC SURGERY Left     L TKA     ORTHOPEDIC SURGERY Right     R TKA      No Known Allergies   Social History     Tobacco Use     Smoking status: Never Smoker     Smokeless tobacco: Never Used   Substance Use Topics     Alcohol use: Never      Wt Readings from Last 1 Encounters:   21 83.5 kg (184 lb)        Anesthesia Evaluation   Pt has had prior anesthetic. Type: General.    No history of anesthetic complications       ROS/MED HX  ENT/Pulmonary:     (+) sleep apnea,  (-) tobacco use   Neurologic:       Cardiovascular:     (+) Dyslipidemia -----Previous cardiac testing   Echo: Date: Results:    Stress Test: Date: Results:    ECG Reviewed: Date: 22 Results:  NSR  Cath: Date: Results:      METS/Exercise Tolerance:     Hematologic:       Musculoskeletal:   (+) arthritis,     GI/Hepatic:     (+) GERD,     Renal/Genitourinary:       Endo:     (+) Obesity (Class 1),     Psychiatric/Substance Use:       Infectious Disease:       Malignancy:       Other:      (+) , H/O Chronic Pain (Fibromyalgia),           OUTSIDE LABS:  CBC:   Lab Results   Component Value Date    HGB 10.5 (L) 2021    HGB 11.4 (L) 2021     BMP:   Lab Results   Component Value Date    GLC 93 2021     COAGS: No results found for: PTT, INR, FIBR  POC: No results found for: BGM, HCG, HCGS  HEPATIC: No  results found for: ALBUMIN, PROTTOTAL, ALT, AST, GGT, ALKPHOS, BILITOTAL, BILIDIRECT, CHARLETTE  OTHER: No results found for: PH, LACT, A1C, SIMONA, PHOS, MAG, LIPASE, AMYLASE, TSH, T4, T3, CRP, SED    Anesthesia Plan    ASA Status:  2   NPO Status:  NPO Appropriate    Anesthesia Type: Spinal.              Consents    Anesthesia Plan(s) and associated risks, benefits, and realistic alternatives discussed. Questions answered and patient/representative(s) expressed understanding.    - Discussed:     - Discussed with:  Patient         Postoperative Care    Pain management: IV analgesics, Oral pain medications, Peripheral nerve block (Single Shot), Multi-modal analgesia, Neuraxial analgesia.   PONV prophylaxis: Ondansetron (or other 5HT-3)     Comments:                Myles Hugo MD

## 2022-04-20 NOTE — INTERVAL H&P NOTE
"I have reviewed the surgical (or preoperative) H&P that is linked to this encounter, and examined the patient. There are no significant changes    Clinical Conditions Present on Arrival:  Clinically Significant Risk Factors Present on Admission                   # Obesity: Estimated body mass index is 33.53 kg/m  as calculated from the following:    Height as of this encounter: 1.6 m (5' 3\").    Weight as of this encounter: 85.9 kg (189 lb 4.8 oz).       "

## 2022-04-20 NOTE — ANESTHESIA PROCEDURE NOTES
Intrathecal Procedure Note    Pre-Procedure   Staff -        Anesthesiologist:  Myles Hugo MD       Performed By: anesthesiologist       Location: OR       Pre-Anesthestic Checklist: patient identified, IV checked, site marked, risks and benefits discussed, informed consent, monitors and equipment checked, pre-op evaluation and at physician/surgeon's request  Timeout:       Correct Patient: Yes        Correct Procedure: Yes        Correct Site: Yes        Correct Position: Yes   Procedure Documentation  Procedure: intrathecal       Patient Position: sitting       Skin prep: Betadine       Insertion Site: L3-4. (midline approach).       Spinal Needle Type: Dinora-Mamadou       Introducer used       Introducer: 20 G       # of attempts: 1 and  # of redirects:     Assessment/Narrative         Paresthesias: No.       CSF fluid: clear.       Opening pressure was cmH2O while  Sitting.       Comments:  Patient sitting on edge of OR bed, lower back cleaned and prepped in sterile fashion with betadine. 1% lido used to numb area. Introducer placed, spinal needle through introducer. Appropriate flow of CSF and confirmed with aspiration via syringe. Spinal dose given, 12 mg 0.75% bupivacaine. No complications.

## 2022-04-21 ENCOUNTER — APPOINTMENT (OUTPATIENT)
Dept: OCCUPATIONAL THERAPY | Facility: CLINIC | Age: 72
DRG: 468 | End: 2022-04-21
Attending: ORTHOPAEDIC SURGERY
Payer: MEDICARE

## 2022-04-21 ENCOUNTER — APPOINTMENT (OUTPATIENT)
Dept: PHYSICAL THERAPY | Facility: CLINIC | Age: 72
DRG: 468 | End: 2022-04-21
Attending: ORTHOPAEDIC SURGERY
Payer: MEDICARE

## 2022-04-21 VITALS
BODY MASS INDEX: 33.54 KG/M2 | OXYGEN SATURATION: 95 % | TEMPERATURE: 97.7 F | HEART RATE: 85 BPM | HEIGHT: 63 IN | WEIGHT: 189.3 LBS | RESPIRATION RATE: 16 BRPM | SYSTOLIC BLOOD PRESSURE: 119 MMHG | DIASTOLIC BLOOD PRESSURE: 71 MMHG

## 2022-04-21 LAB
GLUCOSE BLD-MCNC: 110 MG/DL (ref 70–99)
HGB BLD-MCNC: 12.2 G/DL (ref 11.7–15.7)

## 2022-04-21 PROCEDURE — 97530 THERAPEUTIC ACTIVITIES: CPT | Mod: GP | Performed by: PHYSICAL THERAPIST

## 2022-04-21 PROCEDURE — 97530 THERAPEUTIC ACTIVITIES: CPT | Mod: GO | Performed by: OCCUPATIONAL THERAPIST

## 2022-04-21 PROCEDURE — 97116 GAIT TRAINING THERAPY: CPT | Mod: GP | Performed by: PHYSICAL THERAPIST

## 2022-04-21 PROCEDURE — 82947 ASSAY GLUCOSE BLOOD QUANT: CPT | Performed by: ORTHOPAEDIC SURGERY

## 2022-04-21 PROCEDURE — 97535 SELF CARE MNGMENT TRAINING: CPT | Mod: GO | Performed by: OCCUPATIONAL THERAPIST

## 2022-04-21 PROCEDURE — 85018 HEMOGLOBIN: CPT | Performed by: PHYSICIAN ASSISTANT

## 2022-04-21 PROCEDURE — 250N000011 HC RX IP 250 OP 636: Performed by: PHYSICIAN ASSISTANT

## 2022-04-21 PROCEDURE — 97110 THERAPEUTIC EXERCISES: CPT | Mod: GP | Performed by: PHYSICAL THERAPIST

## 2022-04-21 PROCEDURE — 250N000013 HC RX MED GY IP 250 OP 250 PS 637: Performed by: PHYSICIAN ASSISTANT

## 2022-04-21 PROCEDURE — 97166 OT EVAL MOD COMPLEX 45 MIN: CPT | Mod: GO | Performed by: OCCUPATIONAL THERAPIST

## 2022-04-21 PROCEDURE — 36415 COLL VENOUS BLD VENIPUNCTURE: CPT | Performed by: ORTHOPAEDIC SURGERY

## 2022-04-21 RX ADMIN — HYDROXYZINE HYDROCHLORIDE 10 MG: 10 TABLET ORAL at 06:01

## 2022-04-21 RX ADMIN — CEFAZOLIN SODIUM 2 G: 2 INJECTION, SOLUTION INTRAVENOUS at 02:16

## 2022-04-21 RX ADMIN — ASPIRIN 325 MG: 325 TABLET, COATED ORAL at 08:11

## 2022-04-21 RX ADMIN — SENNOSIDES AND DOCUSATE SODIUM 1 TABLET: 50; 8.6 TABLET ORAL at 08:11

## 2022-04-21 RX ADMIN — PANTOPRAZOLE SODIUM 40 MG: 40 TABLET, DELAYED RELEASE ORAL at 08:11

## 2022-04-21 RX ADMIN — ACETAMINOPHEN 975 MG: 325 TABLET ORAL at 06:01

## 2022-04-21 RX ADMIN — OXYCODONE HYDROCHLORIDE 10 MG: 5 TABLET ORAL at 08:11

## 2022-04-21 RX ADMIN — POLYETHYLENE GLYCOL 3350 17 G: 17 POWDER, FOR SOLUTION ORAL at 08:11

## 2022-04-21 ASSESSMENT — ACTIVITIES OF DAILY LIVING (ADL)
ADLS_ACUITY_SCORE: 4
ADLS_ACUITY_SCORE: 6
ADLS_ACUITY_SCORE: 4
ADLS_ACUITY_SCORE: 4
IADL_COMMENTS: FAMILY CAN ASSIST
ADLS_ACUITY_SCORE: 4
ADLS_ACUITY_SCORE: 6
ADLS_ACUITY_SCORE: 6
ADLS_ACUITY_SCORE: 4
PREVIOUS_RESPONSIBILITIES: MEAL PREP;DRIVING

## 2022-04-21 NOTE — PLAN OF CARE
Goal Outcome Evaluation:    Plan of Care Reviewed With: patient     Overall Patient Progress: improving    Outcome Evaluation: Discharge 04/21/2022 @10:55am    Reviewed discharge instructions and medications with patient:YES  Questions answered:YES  Patient discharged to:Home w/ spouse  All belongs discharged with patient: YES

## 2022-04-21 NOTE — PROGRESS NOTES
"Bee Andrews  2022  POD # 1 s/p Revision right total knee polyexchange  Admit Date:  2022      Doing well.  Clean wound without signs of infection.  Normal healing wound.  No excessive bleeding  Pain well-controlled.  Objective: no chest pain or shortness of breath. Working slowly with therapy. Hopes to discharge home today.   Blood pressure 138/78, pulse 95, temperature 97.9  F (36.6  C), temperature source Oral, resp. rate 18, height 1.6 m (5' 3\"), weight 85.9 kg (189 lb 4.8 oz), SpO2 93 %.    Temperatures:  Current - Temp: 97.9  F (36.6  C); Max - Temp  Av.3  F (36.8  C)  Min: 97.4  F (36.3  C)  Max: 99.4  F (37.4  C)  Pulse range: Pulse  Av.4  Min: 90  Max: 101  Blood pressure range: Systolic (24hrs), Av , Min:110 , Max:151   ; Diastolic (24hrs), Av, Min:56, Max:83    Exam:  CMS: intact  alert, stable, wound ok  Dressing c/d/i  Calf s/nt  DF/PF   Communicates clearly with examiner    Labs:  No results for input(s): POTASSIUM in the last 26663 hours.  Recent Labs   Lab Test 21  1023 21  0814   HGB 10.5* 11.4*     No results for input(s): INR in the last 66958 hours.  No results for input(s): PLT in the last 27348 hours.    PLAN: WBAT in the RLE. KI on at all times. Follow up with us in 2 weeks.   Aspirin for dvt ppx.     "

## 2022-04-21 NOTE — PLAN OF CARE
Goal Outcome Evaluation:    Patient vital signs are at baseline: Yes  Patient able to ambulate as they were prior to admission or with assist devices provided by therapies during their stay:  Yes  Patient MUST void prior to discharge:  Yes  Patient able to tolerate oral intake:  Yes  Pain has adequate pain control using Oral analgesics:  Yes  Does patient have an identified :  Yes  Has goal D/C date and time been discussed with patient:  Yes    A&O x4. Up w/ A1 GB & walker. IV SL. Ice to knee, oxycodone & scheduled tylenol for pain. Ambulated the whole hallway + BR & tolerated well. Immobilizer in place. CMS intact. Dressing CDI. Plan to discharge home this AM.

## 2022-04-21 NOTE — DISCHARGE SUMMARY
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]  S/P revision of total knee, right [Z96.651]  Discharge Diagnosis: failed hardware right total knee replacement  Procedures Performed: revision right total knee with polyexchange only  Complications: noneNone apparent   History of Present Illness: see operative report for full HPI  Past Medical History:   Past Medical History:   Diagnosis Date     Fibromyalgia      Gastroesophageal reflux disease      Hyperlipidemia      Osteopenia      Sleep apnea      Tinnitus of both ears      Patient Active Problem List   Diagnosis     S/P revision of total knee     Status post revision of total replacement of left knee     S/P revision of total knee, right     Past Surgical History:   Procedure Laterality Date     ARTHROPLASTY REVISION KNEE Left 11/19/2021    Procedure: REVISION LEFT TOTAL KNEE ARTHROPLASTY;  Surgeon: Adelaida Osborne MD;  Location:  OR     ARTHROPLASTY REVISION KNEE Right 4/20/2022    Procedure: RIGHT TOTAL KNEE REVISION WITH POLYETHYLENE EXCHANGE;  Surgeon: Adelaida Osborne MD;  Location:  OR     COLONOSCOPY       GYN SURGERY      tubal ligation     ORTHOPEDIC SURGERY Left 2009    L TKA     ORTHOPEDIC SURGERY Right 2010    R TKA     Social History     Socioeconomic History     Marital status:      Spouse name: Not on file     Number of children: Not on file     Years of education: Not on file     Highest education level: Not on file   Occupational History     Not on file   Tobacco Use     Smoking status: Never Smoker     Smokeless tobacco: Never Used   Substance and Sexual Activity     Alcohol use: Never     Drug use: Never     Sexual activity: Not on file   Other Topics Concern     Not on file   Social History Narrative     Not on  file     Social Determinants of Health     Financial Resource Strain: Not on file   Food Insecurity: Not on file   Transportation Needs: Not on file   Physical Activity: Not on file   Stress: Not on file   Social Connections: Not on file   Intimate Partner Violence: Not on file   Housing Stability: Not on file     Medications on admission:   Medications Prior to Admission   Medication Sig Dispense Refill Last Dose     acetaminophen (TYLENOL) 500 MG tablet Take 1,000 mg by mouth every 6 hours as needed for mild pain (2 x 500 mg = 1000 mg)    at prn     amoxicillin (AMOXIL) 500 MG capsule Take 2,000 mg by mouth once as needed (one hour prior to dental appoinment 4 x 500 mg = 2000 mg)    at prn     Calcium-Magnesium-Vitamin D (CALCIUM 1200+D3 PO) Take 1 capsule by mouth daily   4/19/2022 at am     Carboxymethylcellulose Sodium (REFRESH TEARS OP) Place 1 drop into both eyes 3 times daily as needed   4/19/2022 at am     ezetimibe (ZETIA) 10 MG tablet Take 10 mg by mouth At Bedtime   4/19/2022 at pm     Flaxseed, Linseed, (FLAXSEED OIL) 1000 MG CAPS Take 1 capsule by mouth daily   4/19/2022 at am     loratadine (CLARITIN) 10 MG tablet Take 1-2 tablets by mouth daily as needed for allergies    at prn     Multiple Vitamins-Minerals (PRESERVISION AREDS 2 PO) Take 1 capsule by mouth 2 times daily   4/19/2022 at am     multivitamin (CENTRUM SILVER) tablet Take 1 tablet by mouth daily   4/19/2022 at am     omeprazole (PRILOSEC) 20 MG DR capsule Take 20 mg by mouth daily   4/20/2022 at am     Phenylephrine-APAP-guaiFENesin (TYLENOL SINUS SEVERE PO) Take 1 tablet by mouth daily as needed    at prn     Turmeric 500 MG TABS Take 1 tablet by mouth daily   4/19/2022 at am     Allergies:  No Known Allergies    Hospital Course: Patient was admitted to Orthopedics and brought to the OR on where she underwent the above named procedure. Postoperatively she did very well with no apparent complications, and she had an uneventful hospital  stay. Antibiotics were given for 24 hours after surgery. She was given aspirin for DVT prophylaxis and her pain was well controlled with oral meds, then transitioned to oral pain medications during her hospital stay. She progressed well with physical therapy and discharge to home was recommended.    Consultations Obtained During Hospitalization:  2. Physical Therapy for gait training, mobilization, range of motion and strengthening exercises  3. Occupational Therapy for activities of daily living.  Active Problems:    S/P revision of total knee, right    Discharge Disposition: patient improving  Discharge Diet: resume normal pre op diet  Discharge Medications:   Current Discharge Medication List      START taking these medications    Details   !! acetaminophen (TYLENOL) 325 MG tablet Take 2 tablets (650 mg) by mouth every 4 hours as needed for other (mild pain)  Qty: 100 tablet, Refills: 0    Associated Diagnoses: S/P revision of total knee, right      aspirin (ASA) 325 MG EC tablet Take 1 tablet (325 mg) by mouth daily  Qty: 30 tablet, Refills: 0    Associated Diagnoses: S/P revision of total knee, right      hydrOXYzine (ATARAX) 10 MG tablet Take 1 tablet (10 mg) by mouth every 6 hours as needed for itching or anxiety (with pain, moderate pain)  Qty: 30 tablet, Refills: 0    Associated Diagnoses: S/P revision of total knee, right      ondansetron (ZOFRAN-ODT) 4 MG ODT tab Take 1 tablet (4 mg) by mouth every 8 hours as needed for nausea Dissolve ON the tongue.  Qty: 30 tablet, Refills: 0    Associated Diagnoses: S/P revision of total knee, right      oxyCODONE (ROXICODONE) 5 MG tablet Take 1-2 tablets (5-10 mg) by mouth every 3 hours as needed for pain (Moderate to Severe)  Qty: 30 tablet, Refills: 0    Associated Diagnoses: S/P revision of total knee, right      senna-docusate (SENOKOT-S/PERICOLACE) 8.6-50 MG tablet Take 1-2 tablets by mouth 2 times daily Take while on oral narcotics to prevent or treat  constipation.  Qty: 30 tablet, Refills: 0    Comments: While taking narcotics  Associated Diagnoses: S/P revision of total knee, right       !! - Potential duplicate medications found. Please discuss with provider.      CONTINUE these medications which have NOT CHANGED    Details   !! acetaminophen (TYLENOL) 500 MG tablet Take 1,000 mg by mouth every 6 hours as needed for mild pain (2 x 500 mg = 1000 mg)      amoxicillin (AMOXIL) 500 MG capsule Take 2,000 mg by mouth once as needed (one hour prior to dental appoinment 4 x 500 mg = 2000 mg)      Calcium-Magnesium-Vitamin D (CALCIUM 1200+D3 PO) Take 1 capsule by mouth daily      Carboxymethylcellulose Sodium (REFRESH TEARS OP) Place 1 drop into both eyes 3 times daily as needed      ezetimibe (ZETIA) 10 MG tablet Take 10 mg by mouth At Bedtime      Flaxseed, Linseed, (FLAXSEED OIL) 1000 MG CAPS Take 1 capsule by mouth daily      loratadine (CLARITIN) 10 MG tablet Take 1-2 tablets by mouth daily as needed for allergies      Multiple Vitamins-Minerals (PRESERVISION AREDS 2 PO) Take 1 capsule by mouth 2 times daily      multivitamin (CENTRUM SILVER) tablet Take 1 tablet by mouth daily      omeprazole (PRILOSEC) 20 MG DR capsule Take 20 mg by mouth daily      Phenylephrine-APAP-guaiFENesin (TYLENOL SINUS SEVERE PO) Take 1 tablet by mouth daily as needed      Turmeric 500 MG TABS Take 1 tablet by mouth daily       !! - Potential duplicate medications found. Please discuss with provider.        Code Status:   X-rays needed at the follow up visit:   Saul Johnson PA-C  4/21/2022 7:55 AM   [place]  [place]  [phone]

## 2022-04-21 NOTE — PROGRESS NOTES
"   04/21/22 0930   Quick Adds   Type of Visit Initial Occupational Therapy Evaluation   Living Environment   People in Home spouse;child(ewelina), adult   Current Living Arrangements house   Home Accessibility stairs to enter home   Number of Stairs, Main Entrance 3   Stair Railings, Main Entrance none   Transportation Anticipated family or friend will provide   Living Environment Comments Pt and spouse are staying in daughter's house at discharge, will have assist 24/7, 3 steps to enter   Self-Care   Usual Activity Tolerance good   Current Activity Tolerance moderate   Equipment Currently Used at Home cane, straight   Fall history within last six months no   Activity/Exercise/Self-Care Comment pt indep with ADLs past month, uses SEC most of the time   Instrumental Activities of Daily Living (IADL)   Previous Responsibilities meal prep;driving   IADL Comments family can assist   General Information   Onset of Illness/Injury or Date of Surgery 04/20/22   Referring Physician Saul Johnson   Patient/Family Therapy Goal Statement (OT) \"get back to my garden\"   Additional Occupational Profile Info/Pertinent History of Current Problem s/p revision right total knee with polyexchange only   Existing Precautions/Restrictions fall  (KI at all times x1week; no knee ROM)   Right Lower Extremity (Weight-bearing Status) weight-bearing as tolerated (WBAT)   General Observations and Info activity order: ambulate with assist 3x daily   Cognitive Status Examination   Cognitive Status Comments no cognitive concerns   Pain Assessment   Patient Currently in Pain Yes, see Vital Sign flowsheet   Integumentary/Edema   Integumentary/Edema Comments KI donned throughout session; mild typical post-op swelling RLE   Posture   Posture protracted shoulders   Range of Motion Comprehensive   Comment, General Range of Motion pt demonstrated functional BUE AROM during dressing task; RLE limited by surgical precautions   Strength Comprehensive (MMT) " "  Comment, General Manual Muscle Testing (MMT) Assessment pt demonstrated functional BUE strength during transfers; RLE limited by surgical pain   Transfers   Transfers sit-stand transfer;toilet transfer;shower transfer   Sit-Stand Transfer   Sit-Stand [place] (Transfers) contact guard;verbal cues;supervision   Assistive Device (Sit-Stand Transfers) walker, standard   Shower Transfer   Shower Transfer Comments walk-in shower, 4\" lip   Toilet Transfer   Type (Toilet Transfer) sit-stand;stand-sit   [place] Level (Toilet Transfer) contact guard;verbal cues   Balance   Balance Comments good seated; good ambulating in room with 2WW   Activities of Daily Living   BADL Assessment/Intervention upper body dressing;lower body dressing   Upper Body Dressing Assessment/Training   [place] Level (Upper Body Dressing) independent   Lower Body Dressing Assessment/Training   [place] Level (Lower Body Dressing) minimum assist (75% patient effort);verbal cues   Clinical Impression   Criteria for Skilled Therapeutic Interventions Met (OT) Yes, treatment indicated   OT Diagnosis impaired ADLs   OT Problem List-Impairments impacting ADL problems related to;range of motion (ROM);strength;pain;post-surgical precautions   Assessment of Occupational Performance 3-5 Performance Deficits   Identified Performance Deficits dressing, bathing, toileting, home chores   Planned Therapy Interventions (OT) ADL retraining;transfer training   Clinical Decision Making Complexity (OT) moderate complexity   Risk & Benefits of therapy have been explained evaluation/treatment results reviewed;care plan/treatment goals reviewed;participants included;patient   OT Discharge Planning   OT Rationale for DC Rec anticipate pt will be SBA LE dressing, Adam shower transfer, Art toilet transfer, A for home chores   Therapy Certification   Start of Care Date 04/21/22   Certification date from 04/21/22   Certification date to 04/21/22   Medical " Diagnosis TKA revision   Total Evaluation Time (Minutes)   Total Evaluation Time (Minutes) 5

## 2022-04-21 NOTE — PLAN OF CARE
Occupational Therapy Discharge Summary    Reason for therapy discharge:    All goals and outcomes met, no further needs identified.    Progress towards therapy goal(s). See goals on Care Plan in [facility] electronic health record for goal details.  Goals met    Therapy recommendation(s):    No further therapy is recommended.

## 2022-04-21 NOTE — CARE PLAN
Physical Therapy Discharge Summary    Reason for therapy discharge:    Discharged to home with outpatient therapy.    Progress towards therapy goal(s). See goals on Care Plan in [facility] electronic health record for goal details.  Goals met    Therapy recommendation(s):    Continued therapy is recommended.  Rationale/Recommendations:  to maxiimize surgical outcome.
